# Patient Record
Sex: MALE | Race: WHITE | NOT HISPANIC OR LATINO | Employment: OTHER | URBAN - NONMETROPOLITAN AREA
[De-identification: names, ages, dates, MRNs, and addresses within clinical notes are randomized per-mention and may not be internally consistent; named-entity substitution may affect disease eponyms.]

---

## 2023-11-13 PROBLEM — Z79.01 ANTICOAGULATED: Status: ACTIVE | Noted: 2023-11-13

## 2023-11-13 PROBLEM — Z98.890 STATUS POST LIGATION OF LEFT ATRIAL APPENDAGE: Status: ACTIVE | Noted: 2023-11-13

## 2023-11-13 PROBLEM — I48.19 PERSISTENT ATRIAL FIBRILLATION (MULTI): Status: ACTIVE | Noted: 2023-11-13

## 2023-11-13 PROBLEM — I25.810 ATHEROSCLEROSIS OF CORONARY ARTERY BYPASS GRAFT OF NATIVE HEART WITHOUT ANGINA PECTORIS: Status: ACTIVE | Noted: 2023-11-13

## 2023-11-13 PROBLEM — E78.5 HYPERLIPIDEMIA: Status: ACTIVE | Noted: 2023-11-13

## 2023-11-13 PROBLEM — Z79.899 HIGH RISK MEDICATION USE: Status: ACTIVE | Noted: 2023-11-13

## 2023-11-13 PROBLEM — I10 HYPERTENSION, ESSENTIAL, BENIGN: Status: ACTIVE | Noted: 2023-11-13

## 2023-11-13 PROBLEM — Z95.2 S/P AVR (AORTIC VALVE REPLACEMENT): Status: ACTIVE | Noted: 2023-11-13

## 2023-11-13 RX ORDER — AMIODARONE HYDROCHLORIDE 200 MG/1
100 TABLET ORAL EVERY OTHER DAY
COMMUNITY
Start: 2021-02-12 | End: 2024-02-05 | Stop reason: SDUPTHER

## 2023-11-13 RX ORDER — LATANOPROST 50 UG/ML
SOLUTION/ DROPS OPHTHALMIC
COMMUNITY

## 2023-11-13 RX ORDER — ASPIRIN 81 MG/1
1 TABLET ORAL DAILY
COMMUNITY
End: 2024-04-02 | Stop reason: WASHOUT

## 2023-11-13 RX ORDER — TAMSULOSIN HYDROCHLORIDE 0.4 MG/1
1 CAPSULE ORAL DAILY
COMMUNITY
End: 2024-03-06 | Stop reason: WASHOUT

## 2023-11-13 RX ORDER — DORZOLAMIDE HCL 20 MG/ML
SOLUTION/ DROPS OPHTHALMIC 2 TIMES DAILY
COMMUNITY

## 2023-11-13 RX ORDER — ROSUVASTATIN CALCIUM 10 MG/1
1 TABLET, COATED ORAL DAILY
COMMUNITY

## 2023-11-13 RX ORDER — SULFASALAZINE 500 MG/1
1500 TABLET ORAL 2 TIMES DAILY
COMMUNITY

## 2023-11-13 RX ORDER — TRIAMCINOLONE ACETONIDE 1 MG/G
CREAM TOPICAL
COMMUNITY

## 2023-11-13 RX ORDER — AMOXICILLIN 500 MG/1
2000 CAPSULE ORAL
COMMUNITY

## 2023-11-13 RX ORDER — ACETAMINOPHEN 500 MG
1 TABLET ORAL DAILY
COMMUNITY

## 2023-11-14 ENCOUNTER — TELEPHONE (OUTPATIENT)
Dept: CARDIOLOGY | Facility: CLINIC | Age: 83
End: 2023-11-14
Payer: MEDICARE

## 2023-11-14 NOTE — TELEPHONE ENCOUNTER
Patient states he is taking Amio 100 mg tablet 1/2 tablet daily.  Patient is having a hard time breaking the tablet and often if not an even break of the tablet. Advised this is the lowest strength.   Patient also expresses concern about taking Eliquis wit hx of AVR. States he has been watching commercial on TV regarding this  Patient also states balance has not been good. Advised to contact pcp to address and inquire about PT.   December visit pending. To Dr. Edgard Hayward, DO

## 2023-11-24 NOTE — TELEPHONE ENCOUNTER
Patient phoned above instructions discussed. Verbalized understanding. States if he has further questions he will address at upcoming OV in December.

## 2023-11-24 NOTE — TELEPHONE ENCOUNTER
Attempted to phone patient, left detailed message to return call. Transferred to [facility] for follow up.

## 2023-12-05 ENCOUNTER — TELEPHONE (OUTPATIENT)
Dept: CARDIOLOGY | Facility: CLINIC | Age: 83
End: 2023-12-05
Payer: MEDICARE

## 2023-12-05 DIAGNOSIS — I48.19 PERSISTENT ATRIAL FIBRILLATION (MULTI): ICD-10-CM

## 2023-12-05 DIAGNOSIS — Z95.2 S/P AVR (AORTIC VALVE REPLACEMENT): ICD-10-CM

## 2023-12-05 NOTE — TELEPHONE ENCOUNTER
Patient called and states he would like an order for a handcap  placard. He states that he does get more winded when walking into the stores. He also questioned the commercial for Eliquis and AVR. I advised his is bioprosthetic and he is not on Eliquis for the Valve but is for his Atrial fib. He verbalized understanding.     TO Marily Silvestre NP

## 2023-12-05 NOTE — TELEPHONE ENCOUNTER
Printed handicap placard. And placed in mail. Spoke with patient and advised of th above. He verbalized understanding.

## 2023-12-14 ENCOUNTER — OFFICE VISIT (OUTPATIENT)
Dept: CARDIOLOGY | Facility: CLINIC | Age: 83
End: 2023-12-14
Payer: MEDICARE

## 2023-12-14 VITALS
BODY MASS INDEX: 26.06 KG/M2 | HEART RATE: 51 BPM | HEIGHT: 67 IN | DIASTOLIC BLOOD PRESSURE: 90 MMHG | SYSTOLIC BLOOD PRESSURE: 148 MMHG | WEIGHT: 166 LBS

## 2023-12-14 DIAGNOSIS — E78.2 MIXED HYPERLIPIDEMIA: ICD-10-CM

## 2023-12-14 DIAGNOSIS — Z95.2 S/P AVR (AORTIC VALVE REPLACEMENT): ICD-10-CM

## 2023-12-14 DIAGNOSIS — E66.3 OVERWEIGHT (BMI 25.0-29.9): ICD-10-CM

## 2023-12-14 DIAGNOSIS — Z79.899 HIGH RISK MEDICATION USE: ICD-10-CM

## 2023-12-14 DIAGNOSIS — Z79.01 ANTICOAGULATED: ICD-10-CM

## 2023-12-14 DIAGNOSIS — Z98.890 STATUS POST LIGATION OF LEFT ATRIAL APPENDAGE: ICD-10-CM

## 2023-12-14 DIAGNOSIS — I10 HYPERTENSION, ESSENTIAL, BENIGN: ICD-10-CM

## 2023-12-14 DIAGNOSIS — I25.810 ATHEROSCLEROSIS OF CORONARY ARTERY BYPASS GRAFT OF NATIVE HEART WITHOUT ANGINA PECTORIS: ICD-10-CM

## 2023-12-14 DIAGNOSIS — I48.19 PERSISTENT ATRIAL FIBRILLATION (MULTI): ICD-10-CM

## 2023-12-14 PROCEDURE — 93000 ELECTROCARDIOGRAM COMPLETE: CPT | Performed by: INTERNAL MEDICINE

## 2023-12-14 PROCEDURE — 3080F DIAST BP >= 90 MM HG: CPT | Performed by: INTERNAL MEDICINE

## 2023-12-14 PROCEDURE — 99214 OFFICE O/P EST MOD 30 MIN: CPT | Performed by: INTERNAL MEDICINE

## 2023-12-14 PROCEDURE — 1159F MED LIST DOCD IN RCRD: CPT | Performed by: INTERNAL MEDICINE

## 2023-12-14 PROCEDURE — 3077F SYST BP >= 140 MM HG: CPT | Performed by: INTERNAL MEDICINE

## 2023-12-14 PROCEDURE — 1160F RVW MEDS BY RX/DR IN RCRD: CPT | Performed by: INTERNAL MEDICINE

## 2023-12-14 PROCEDURE — 1036F TOBACCO NON-USER: CPT | Performed by: INTERNAL MEDICINE

## 2023-12-14 RX ORDER — FLUOXETINE HYDROCHLORIDE 20 MG/1
20 CAPSULE ORAL DAILY
COMMUNITY
End: 2024-03-06 | Stop reason: WASHOUT

## 2023-12-14 RX ORDER — LEVOTHYROXINE SODIUM 88 UG/1
88 TABLET ORAL
COMMUNITY

## 2023-12-14 NOTE — PROGRESS NOTES
"Subjective   Geovanny Elise is a 83 y.o. male       Chief Complaint    Follow-up          83 soon-to-be 84-year-old gentleman returns for follow-up, has increasing ambulatory issues, gait instability however no falls.  He has becomes more short of breath with exertion, has less stamina he states ever since his aortic valve replacement.     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     He underwent aortic valve replacement with trifecta Saint Mono bioprosthetic aortic valve in 2021, and left atrial clip appendage occluder device.     He has paroxysmal atrial fibrillation and today's ECG reveals A-fib with a rate of 51 and a QT corrected interval 405 ms.     He remains on amiodarone and Eliquis with no bleeding or thromboembolic events. He has symptoms of fatigue and GI/bowel related complaints.    Recent July Holter monitor revealed sinus rhythm; however he has persistent, paroxysmal atrial fibs/flutter even on amiodarone.  He has relative intolerance to amiodarone with persistent bradycardia now on alternative dosing 100 mg every other day with heart rate of 51.    He is to see Dr. Page January 4, 2024.  At that appointment I would like him to address consideration for either alternative antiarrhythmic therapy versus rate control as I believe he is having side effects from the amiodarone.  Fortunately he has had no angina or heart failure events.    Will otherwise follow-up with nurse practitioner in 6 months, continue same amiodarone and Eliquis dosing (notably he has had atrial clip JAZZ occlusion device) so therefore anticoagulation therapy might not necessarily be needed.           Review of Systems   All other systems reviewed and are negative.         Visit Vitals  /90 (BP Location: Left arm, Patient Position: Sitting)   Pulse 51   Ht 1.702 m (5' 7\")   Wt 75.3 kg (166 lb)   BMI 26.00 kg/m²   Smoking Status Former   BSA 1.89 m²        Objective   Physical " Exam  Constitutional:       Appearance: Normal appearance. He is normal weight.   HENT:      Nose: Nose normal.   Neck:      Vascular: No carotid bruit.   Cardiovascular:      Rate and Rhythm: Bradycardia present. Rhythm irregular.      Pulses: Normal pulses.      Heart sounds: Normal heart sounds.   Pulmonary:      Effort: Pulmonary effort is normal.   Abdominal:      General: Bowel sounds are normal.      Palpations: Abdomen is soft.   Genitourinary:     Rectum: Normal.   Musculoskeletal:         General: Normal range of motion.      Cervical back: Normal range of motion.      Right lower leg: No edema.      Left lower leg: No edema.   Skin:     General: Skin is warm and dry.   Neurological:      General: No focal deficit present.      Mental Status: He is alert.   Psychiatric:         Mood and Affect: Mood normal.         Behavior: Behavior normal.         Thought Content: Thought content normal.         Judgment: Judgment normal.         Current Medications    Current Outpatient Medications:     amiodarone (Pacerone) 200 mg tablet, Take 0.5 tablets (100 mg) by mouth every other day., Disp: , Rfl:     amoxicillin (Amoxil) 500 mg capsule, Take 4 capsules (2,000 mg) by mouth. Prior to dental work, Disp: , Rfl:     apixaban (Eliquis) 5 mg tablet, Take 1 tablet (5 mg) by mouth 2 times a day., Disp: , Rfl:     aspirin 81 mg EC tablet, Take 1 tablet (81 mg) by mouth once daily. Take one tablet by mouth twice a week, Disp: , Rfl:     calcium carbonate-vitamin D3 600 mg-20 mcg (800 unit) tablet, Take 1 tablet by mouth once daily., Disp: , Rfl:     dorzolamide (Trusopt) 2 % ophthalmic solution, Administer into affected eye(s) twice a day., Disp: , Rfl:     FLUoxetine (PROzac) 20 mg capsule, Take 1 capsule (20 mg) by mouth once daily., Disp: , Rfl:     latanoprost (Xalatan) 0.005 % ophthalmic solution, Administer into affected eye(s) once daily., Disp: , Rfl:     levothyroxine (Synthroid, Levoxyl) 88 mcg tablet, Take 1  tablet (88 mcg) by mouth once daily in the morning. Take before meals., Disp: , Rfl:     multivit-min/ferrous fumarate (MULTI VITAMIN ORAL), Take 1 tablet by mouth once daily., Disp: , Rfl:     rosuvastatin (Crestor) 10 mg tablet, Take 1 tablet (10 mg) by mouth once daily., Disp: , Rfl:     sulfaSALAzine (Azulfidine) 500 mg tablet, Take 3 tablets (1,500 mg) by mouth 3 times a day., Disp: , Rfl:     tamsulosin (Flomax) 0.4 mg 24 hr capsule, Take 1 capsule (0.4 mg) by mouth once daily., Disp: , Rfl:     triamcinolone (Kenalog) 0.1 % cream, as directed, Disp: , Rfl:                      Assessment/Plan   1. Atherosclerosis of coronary artery bypass graft of native heart without angina pectoris        2. Persistent atrial fibrillation (CMS/HCC)        3. S/P AVR (aortic valve replacement)        4. Status post ligation of left atrial appendage        5. Hypertension, essential, benign        6. Mixed hyperlipidemia        7. High risk medication use        8. Anticoagulated        9. Overweight (BMI 25.0-29.9)             EKG done in office today

## 2023-12-14 NOTE — PATIENT INSTRUCTIONS
Please bring all medicines, vitamins, and herbal supplements with you when you come to the office.    Prescriptions will not be filled unless you are compliant with your follow up appointments or have a follow up appointment scheduled as per instruction of your physician. Refills should be requested at the time of your visit.     Amiodarone follow up per routine

## 2024-01-09 ENCOUNTER — TELEPHONE (OUTPATIENT)
Dept: CARDIOLOGY | Facility: CLINIC | Age: 84
End: 2024-01-09
Payer: MEDICARE

## 2024-01-09 NOTE — TELEPHONE ENCOUNTER
Dr. Kim office phoned reports patient to have dental extraction, no date provided. Requesting hold eliquis 3 days prior. Inquiring if pre med needed. TO Marily Silvestre NP in Dr. Edgard Hayward, DO absence.     Patient had AVR in the past will require ATB, follow AHA guidelines.     Callback [phone]

## 2024-02-05 DIAGNOSIS — I48.19 PERSISTENT ATRIAL FIBRILLATION (MULTI): ICD-10-CM

## 2024-02-05 RX ORDER — AMIODARONE HYDROCHLORIDE 100 MG/1
100 TABLET ORAL EVERY OTHER DAY
Qty: 45 TABLET | Refills: 1 | Status: SHIPPED | OUTPATIENT
Start: 2024-02-05 | End: 2024-03-06 | Stop reason: ALTCHOICE

## 2024-02-05 NOTE — TELEPHONE ENCOUNTER
Patient requesting the 100mg tablet, states difficult to cut for him. Advised there may be a price difference he stated ok, to please send the 100mg tablets to [facility].

## 2024-03-06 ENCOUNTER — OFFICE VISIT (OUTPATIENT)
Dept: CARDIOLOGY | Facility: CLINIC | Age: 84
End: 2024-03-06
Payer: MEDICARE

## 2024-03-06 VITALS
BODY MASS INDEX: 24.8 KG/M2 | DIASTOLIC BLOOD PRESSURE: 78 MMHG | HEIGHT: 67 IN | HEART RATE: 68 BPM | SYSTOLIC BLOOD PRESSURE: 130 MMHG | WEIGHT: 158 LBS

## 2024-03-06 DIAGNOSIS — Z87.891 FORMER SMOKER: ICD-10-CM

## 2024-03-06 DIAGNOSIS — Z98.890 STATUS POST LIGATION OF LEFT ATRIAL APPENDAGE: ICD-10-CM

## 2024-03-06 DIAGNOSIS — Z79.01 ANTICOAGULATED: ICD-10-CM

## 2024-03-06 DIAGNOSIS — Z79.899 HIGH RISK MEDICATION USE: ICD-10-CM

## 2024-03-06 DIAGNOSIS — I48.19 PERSISTENT ATRIAL FIBRILLATION (MULTI): Primary | ICD-10-CM

## 2024-03-06 PROCEDURE — 3075F SYST BP GE 130 - 139MM HG: CPT | Performed by: INTERNAL MEDICINE

## 2024-03-06 PROCEDURE — 1036F TOBACCO NON-USER: CPT | Performed by: INTERNAL MEDICINE

## 2024-03-06 PROCEDURE — 93000 ELECTROCARDIOGRAM COMPLETE: CPT | Performed by: INTERNAL MEDICINE

## 2024-03-06 PROCEDURE — 3078F DIAST BP <80 MM HG: CPT | Performed by: INTERNAL MEDICINE

## 2024-03-06 PROCEDURE — 1157F ADVNC CARE PLAN IN RCRD: CPT | Performed by: INTERNAL MEDICINE

## 2024-03-06 PROCEDURE — 1159F MED LIST DOCD IN RCRD: CPT | Performed by: INTERNAL MEDICINE

## 2024-03-06 PROCEDURE — 99215 OFFICE O/P EST HI 40 MIN: CPT | Performed by: INTERNAL MEDICINE

## 2024-03-06 NOTE — PATIENT INSTRUCTIONS
STOP Amiodarone  Will obtain 48 hour holter in 2 weeks  Will also obtain Echocardiogram  Follow up after testing  Continue same medications and treatments.   Patient educated on proper medication use.   Patient educated on risk factor modification.   Please bring any lab results from other providers / physicians to your next appointment.     Please bring all medicines, vitamins, and herbal supplements with you when you come to the office.     Prescriptions will not be filled unless you are compliant with your follow up appointments or have a follow up appointment scheduled as per instruction of your physician. Refills should be requested at the time of your visit.   April MURRAY LPN, am scribing for and in the presence of Dr. Nelson Page MD

## 2024-03-06 NOTE — PROGRESS NOTES
CARDIOLOGY OFFICE VISIT      CHIEF COMPLAINT  Chief Complaint   Patient presents with    Follow-up     Patient presented today for a 1 year follow up.  EKG done in office today.         HISTORY OF PRESENT ILLNESS  HPI    84-year-old  male who is followed for valvular heart disease status post tricuspid aortic valve replacement with a #23 Saint Mono trifecta valve on 2021 and left atrial appendage exclusion with a #35 AtriCure clip. He presents to the office today for follow-up evaluation of atrial fibrillation controlled on amiodarone and anticoagulated with Eliquis. He has a history of hypothyroidism on replacement therapy and dyslipidemia on statin.    Patient states that lately he has been noticing getting more tired fatigue doing physical activities as well as dizziness.  Patient was recently evaluated with primary cardiology team.  During this evaluation EKG revealed atrial fibrillation with a rate of 51 bpm.    EKG performed today shows atrial fibrillation rate of 60 bpm QRS ration 70 ms QT corrected 430 ms.  Rhythm strip shows the same pattern.    Patient still using amiodarone 100 mg every other day as well as Eliquis therapy.        Past Medical History  Past Medical History:   Diagnosis Date    COVID-19 2021    COVID    Nonrheumatic aortic (valve) stenosis 2022    Aortic stenosis, moderate    Overweight 2022    Overweight with body mass index (BMI) of 26 to 26.9 in adult    Presence of aortocoronary bypass graft 2022    S/P CABG (coronary artery bypass graft)       Social History  Social History     Tobacco Use    Smoking status: Former     Types: Cigarettes     Quit date:      Years since quittin.1    Smokeless tobacco: Never   Substance Use Topics    Alcohol use: Never    Drug use: Never       Family History     Family History   Problem Relation Name Age of Onset    Diabetes Mother      Coronary artery disease Mother      Coronary artery disease  Father      Colon cancer Brother          Allergies:  No Known Allergies     Outpatient Medications:  Current Outpatient Medications   Medication Instructions    amiodarone (PACERONE) 100 mg, oral, Every other day, Patient requesting smaller tablet form    amoxicillin (AMOXIL) 2,000 mg, oral, Prior to dental work     apixaban (Eliquis) 5 mg tablet 1 tablet, oral, 2 times daily    aspirin 81 mg EC tablet 1 tablet, oral, Daily, Take one tablet by mouth twice a week    calcium carbonate-vitamin D3 600 mg-20 mcg (800 unit) tablet 1 tablet, oral, Daily    dorzolamide (Trusopt) 2 % ophthalmic solution ophthalmic (eye), 2 times daily    latanoprost (Xalatan) 0.005 % ophthalmic solution ophthalmic (eye), Daily RT    levothyroxine (SYNTHROID, LEVOXYL) 88 mcg, oral, Daily before breakfast    multivit-min/ferrous fumarate (MULTI VITAMIN ORAL) 1 tablet, oral, Daily    rosuvastatin (Crestor) 10 mg tablet 1 tablet, oral, Daily    sulfaSALAzine (AZULFIDINE) 1,000 mg, oral, 2 times daily    triamcinolone (Kenalog) 0.1 % cream as directed          REVIEW OF SYSTEMS  Review of Systems   All other systems reviewed and are negative.        VITALS  Vitals:    03/06/24 1104   BP: 130/78   Pulse: 68       PHYSICAL EXAM  Constitutional:       Appearance: Healthy appearance. Not in distress.   Eyes:      Pupils: Pupils are equal, round, and reactive to light.   HENT:      Nose: Nose normal.   Neck:      Thyroid: Thyroid normal.   Pulmonary:      Effort: Pulmonary effort is normal.      Breath sounds: Normal breath sounds.   Cardiovascular:      PMI at left midclavicular line. Normal rate. Irregularly irregular rhythm. Normal S1. Normal S2.       Murmurs: There is no murmur.      No gallop.  No click. No rub.   Pulses:     Intact distal pulses.   Edema:     Peripheral edema absent.   Abdominal:      General: Bowel sounds are normal.   Musculoskeletal: Normal range of motion.      Cervical back: Normal range of motion. Skin:     General: Skin  is warm and dry.   Neurological:      Mental Status: Alert and oriented to person, place and time.           ASSESSMENT AND PLAN  Clinical impressions:  1. Aortic valve stenosis status post aortic valve replacement with a #23 Saint Mono trifecta valve on February 8, 2021 and left atrial appendage exclusion with a #35 AtriCure clip.  2. Paroxysmal atrial fibrillation controlled on amiodarone and anticoagulated with Eliquis.  3. Normal left ventricular function per 2D echocardiogram dated November 9, 2021.  4. Dyslipidemia on statin.  5. Hypertension, controlled  6. Hypothyroidism on replacement therapy.  7. Benign prostatic hypertrophy.  8. Coronary artery disease status post remote coronary artery bypass graft surgery.  9. Overweight with a BMI of 24    Plan recommendations    I had a lengthy discussion with patient regarding management for atrial fibrillation, high risk medication and results of Holter monitor.    Holter monitor ordered in July 2023 shows underlying rhythm was sinus rhythm with minimal heart rate of 45 bpm maximal heart rate of 90 bpm average heart rate of 60 bpm.  The longest pause was 2 seconds at 2:10 AM.  No evidence of atrial fibrillation.    Patient is currently on amiodarone therapy.  Due to persistent atrial fibrillation, and evidence of sinus node dysfunction, amiodarone will be discontinued.  Patient also has been noticing occasionally problems with his balance.    We will reorder the Holter monitor for 48 hours for evaluation of sinus node function during atrial fibrillation.    He will be seen in my office in the next 3 to 4 weeks or sooner if needed.    Get echocardiogram for left ventricular ejection fraction evaluation.    Risk factor modification and lifestyle modification discussed with patient. Diet , exercise and hydration discussed with patient.    I have personally review with patient during this office visit, laboratory data, echocardiogram results, stress test results,  Holter-event monitor results prior and after the last electrophysiology visit. All questions has been answered.    Please excuse any errors in grammar or translation related to this dictation.  Voice recognition software was utilized to prepare this document.

## 2024-03-20 ENCOUNTER — APPOINTMENT (OUTPATIENT)
Dept: CARDIOLOGY | Facility: CLINIC | Age: 84
End: 2024-03-20
Payer: MEDICARE

## 2024-03-21 ENCOUNTER — HOSPITAL ENCOUNTER (OUTPATIENT)
Dept: CARDIOLOGY | Facility: CLINIC | Age: 84
Discharge: HOME | End: 2024-03-21
Payer: MEDICARE

## 2024-03-21 ENCOUNTER — ANCILLARY PROCEDURE (OUTPATIENT)
Dept: CARDIOLOGY | Facility: CLINIC | Age: 84
End: 2024-03-21
Payer: MEDICARE

## 2024-03-21 VITALS
HEIGHT: 67 IN | SYSTOLIC BLOOD PRESSURE: 130 MMHG | BODY MASS INDEX: 24.8 KG/M2 | WEIGHT: 158 LBS | DIASTOLIC BLOOD PRESSURE: 78 MMHG

## 2024-03-21 DIAGNOSIS — I48.19 PERSISTENT ATRIAL FIBRILLATION (MULTI): ICD-10-CM

## 2024-03-21 LAB
AORTIC VALVE MEAN GRADIENT: 6 MMHG
AORTIC VALVE PEAK VELOCITY: 1.76 M/S
AV PEAK GRADIENT: 12.4 MMHG
AVA (PEAK VEL): 2.48 CM2
AVA (VTI): 2.79 CM2
EJECTION FRACTION APICAL 4 CHAMBER: 65.6
LEFT VENTRICLE INTERNAL DIMENSION DIASTOLE: 3.6 CM (ref 3.5–6)
LEFT VENTRICULAR OUTFLOW TRACT DIAMETER: 2.3 CM
RIGHT VENTRICLE PEAK SYSTOLIC PRESSURE: 25.7 MMHG

## 2024-03-21 PROCEDURE — 93306 TTE W/DOPPLER COMPLETE: CPT | Performed by: INTERNAL MEDICINE

## 2024-03-21 PROCEDURE — 93227 XTRNL ECG REC<48 HR R&I: CPT | Performed by: INTERNAL MEDICINE

## 2024-03-21 PROCEDURE — 93306 TTE W/DOPPLER COMPLETE: CPT

## 2024-03-21 PROCEDURE — 93225 XTRNL ECG REC<48 HRS REC: CPT | Performed by: INTERNAL MEDICINE

## 2024-03-27 LAB — BODY SURFACE AREA: 1.84 M2

## 2024-04-02 ENCOUNTER — OFFICE VISIT (OUTPATIENT)
Dept: CARDIOLOGY | Facility: CLINIC | Age: 84
End: 2024-04-02
Payer: MEDICARE

## 2024-04-02 VITALS
DIASTOLIC BLOOD PRESSURE: 82 MMHG | WEIGHT: 164 LBS | SYSTOLIC BLOOD PRESSURE: 136 MMHG | HEART RATE: 60 BPM | BODY MASS INDEX: 25.74 KG/M2 | HEIGHT: 67 IN

## 2024-04-02 DIAGNOSIS — I48.21 PERMANENT ATRIAL FIBRILLATION (MULTI): ICD-10-CM

## 2024-04-02 DIAGNOSIS — I48.19 PERSISTENT ATRIAL FIBRILLATION (MULTI): ICD-10-CM

## 2024-04-02 DIAGNOSIS — E66.3 OVERWEIGHT (BMI 25.0-29.9): ICD-10-CM

## 2024-04-02 DIAGNOSIS — Z87.891 FORMER SMOKER: ICD-10-CM

## 2024-04-02 DIAGNOSIS — Z79.899 HIGH RISK MEDICATION USE: ICD-10-CM

## 2024-04-02 DIAGNOSIS — Z79.01 ANTICOAGULATED: Primary | ICD-10-CM

## 2024-04-02 DIAGNOSIS — I10 HYPERTENSION, ESSENTIAL, BENIGN: ICD-10-CM

## 2024-04-02 DIAGNOSIS — Z98.890 STATUS POST LIGATION OF LEFT ATRIAL APPENDAGE: ICD-10-CM

## 2024-04-02 DIAGNOSIS — I25.810 ATHEROSCLEROSIS OF CORONARY ARTERY BYPASS GRAFT OF NATIVE HEART WITHOUT ANGINA PECTORIS: ICD-10-CM

## 2024-04-02 DIAGNOSIS — E78.2 MIXED HYPERLIPIDEMIA: ICD-10-CM

## 2024-04-02 DIAGNOSIS — Z95.2 S/P AVR (AORTIC VALVE REPLACEMENT): ICD-10-CM

## 2024-04-02 DIAGNOSIS — I49.8 CHRONOTROPIC INCOMPETENCE WITH SINUS NODE DYSFUNCTION: ICD-10-CM

## 2024-04-02 PROCEDURE — 1159F MED LIST DOCD IN RCRD: CPT | Performed by: NURSE PRACTITIONER

## 2024-04-02 PROCEDURE — 1157F ADVNC CARE PLAN IN RCRD: CPT | Performed by: NURSE PRACTITIONER

## 2024-04-02 PROCEDURE — 3079F DIAST BP 80-89 MM HG: CPT | Performed by: NURSE PRACTITIONER

## 2024-04-02 PROCEDURE — 1160F RVW MEDS BY RX/DR IN RCRD: CPT | Performed by: NURSE PRACTITIONER

## 2024-04-02 PROCEDURE — 1036F TOBACCO NON-USER: CPT | Performed by: NURSE PRACTITIONER

## 2024-04-02 PROCEDURE — 99214 OFFICE O/P EST MOD 30 MIN: CPT | Performed by: NURSE PRACTITIONER

## 2024-04-02 PROCEDURE — 3075F SYST BP GE 130 - 139MM HG: CPT | Performed by: NURSE PRACTITIONER

## 2024-04-02 NOTE — PATIENT INSTRUCTIONS
Leadless pacemaker implant with Dr. Camilo LEWIS. Hold Eliquis for 2 days before the procedure.  Take all other medications as prescribed the day of the procedure with a sip of water.  No food to eat or drink after midnight the day of the procedure.

## 2024-04-02 NOTE — H&P (VIEW-ONLY)
"CARDIOLOGY OFFICE VISIT      CHIEF COMPLAINT  Chief Complaint   Patient presents with    Atrial Fibrillation     Chief complaint: \"I absolutely have no energy.\"  HISTORY OF PRESENT ILLNESS  HPI  History: The patient is a 84-year-old  male who is followed for valvular heart disease status post tricuspid aortic valve replacement with a #23 Saint Mono trifecta valve on 2021 and left atrial appendage exclusion with a #35 AtriCure clip.  Additionally he is followed for permanent atrial fibrillation on no rate controlling medications and anticoagulated with Eliquis.  He is also followed for hypothyroidism on replacement therapy and dyslipidemia on statin.  He was seen in the office by Dr. Page on 2024 at which time he complained of increased fatigue.  He was at that time taking amiodarone 100 mg every other day and Eliquis.  The amiodarone was discontinued.  The patient underwent a 48-hour Holter monitor and presents to the office today for those results.  He states he remains fatigued.  He reports that he believed after he had his valve replaced he would have an increase in energy.  He states that he is short of breath with climbing stairs or exerting himself.  He denies orthopnea, abdominal distention, or lower extremity edema.  Additionally he denies chest pain, palpitations, dizziness or lightheadedness.  Past Medical History  Past Medical History:   Diagnosis Date    COVID-19 2021    COVID    Nonrheumatic aortic (valve) stenosis 2022    Aortic stenosis, moderate    Overweight 2022    Overweight with body mass index (BMI) of 26 to 26.9 in adult    Presence of aortocoronary bypass graft 2022    S/P CABG (coronary artery bypass graft)       Social History  Social History     Tobacco Use    Smoking status: Former     Types: Cigarettes     Quit date:      Years since quittin.2    Smokeless tobacco: Never   Substance Use Topics    Alcohol use: Never    Drug " use: Never       Family History     Family History   Problem Relation Name Age of Onset    Diabetes Mother      Coronary artery disease Mother      Coronary artery disease Father      Colon cancer Brother          Allergies:  No Known Allergies     Outpatient Medications:  Current Outpatient Medications   Medication Instructions    amoxicillin (AMOXIL) 2,000 mg, oral, Prior to dental work     apixaban (Eliquis) 5 mg tablet 1 tablet, oral, 2 times daily    calcium carbonate-vitamin D3 600 mg-20 mcg (800 unit) tablet 1 tablet, oral, Daily    dorzolamide (Trusopt) 2 % ophthalmic solution ophthalmic (eye), 2 times daily    latanoprost (Xalatan) 0.005 % ophthalmic solution ophthalmic (eye), Daily RT    levothyroxine (SYNTHROID, LEVOXYL) 88 mcg, oral, Daily before breakfast    multivit-min/ferrous fumarate (MULTI VITAMIN ORAL) 1 tablet, oral, Daily    rosuvastatin (Crestor) 10 mg tablet 1 tablet, oral, Daily    sulfaSALAzine (AZULFIDINE) 1,500 mg, oral, 2 times daily    triamcinolone (Kenalog) 0.1 % cream as directed          REVIEW OF SYSTEMS  Review of Systems   All other systems reviewed and are negative.        VITALS  Vitals:    04/02/24 1220   BP: 136/82   Pulse: 60       PHYSICAL EXAM  Vitals and nursing note reviewed.   Constitutional:       Appearance: Normal appearance.   HENT:      Head: Normocephalic.   Neck:      Vascular: No JVD.   Cardiovascular:      Rate and Rhythm: Normal rate and irregular rhythm.      Pulses: Normal pulses.      Heart sounds: Normal heart sounds.   Pulmonary:      Effort: Pulmonary effort is normal.      Breath sounds: Normal breath sounds.   Abdominal:      General: Bowel sounds are normal.      Palpations: Abdomen is soft.   Musculoskeletal:         General: Normal range of motion.      Cervical back: Normal range of motion.   Skin:     General: Skin is warm and dry.   Neurological:      General: No focal deficit present.      Mental Status: She is alert and oriented to person,  place, and time.      Motor: Motor function is intact.   Psychiatric:         Attention and Perception: Attention and perception normal.         Mood and Affect: Mood and affect normal.         Speech: Speech normal.         Behavior: Behavior normal. Behavior is cooperative.         Thought Content: Thought content normal.         Cognition and Memory: Cognition and memory normal.     Labs and testing: Twelve-lead EKG reveals atrial fibrillation with controlled ventricular response with a ventricular rate of 60 bpm.  QRS durations 96 ms,  ms, QTc 420 ms.  No acute ischemic changes or infarct patterns are noted.  48-hour Holter monitor dated March 21, 2024 reveals a minimum heart rate of 33 bpm, average heart rate 58 bpm, maximum heart rate 105 bpm.  1 4 beat run of nonsustained ventricular tachycardia was noted at 1:29 PM at a rate of 146 bpm.  13 sinus pauses lasting 2 seconds to 3.8 seconds were noted with most pauses between 11 AM and 12 PM.  2D echocardiogram dated March 21, 2024 reveals an ejection fraction of 65 to 70%, mild LVH, mild left atrial enlargement, trace to mild MR, mild TR, bioprosthetic aortic valve with normal function.      ASSESSMENT AND PLAN       Clinical impressions:  1. Aortic valve stenosis status post aortic valve replacement with a #23 Saint Mono trifecta valve on February 8, 2021 and left atrial appendage exclusion with a #35 AtriCure clip.  2.  Permanent atrial fibrillation on no rate controlling medications and anticoagulated with Eliquis.  3. Normal left ventricular function per 2D echocardiogram dated March 21, 2024.  4. Dyslipidemia on statin.  5. Hypertension, controlled with a blood pressure today of 136/82.  6. Hypothyroidism on replacement therapy.  7. Benign prostatic hypertrophy.  8. Coronary artery disease status post remote coronary artery bypass graft surgery.  9. Overweight with a BMI of 26.07.    Recommendations:  1.  I reviewed the findings of the Holter monitor  with the patient and I discussed the frequent sinus pauses may be related to the patient's fatigue.  I discussed implantation of a single-chamber pacemaker versus a leadless pacemaker.  Patient is in agreement with undergoing placement of a leadless pacemaker.  Patient will be scheduled for implantation by Dr. Page as soon as possible.  He will hold the Eliquis for 2 days prior to the procedure and take all other medications as prescribed the day of the procedure with a small sip of water.  2.  Discussed routine device follow up is scheduled every 3-4 months.  Inclinic checks alternate with remote (home) checks.  Inclinic checks will be scheduled prior to the office visit with Electrophysiology.  3.  Reviewed the indication, procedure, and imponderables for device implant.  Restrictions post implant include arm lifting up to shoulder height for one month then full range of motion.  Weight lifting restricted to 10 pounds on the affected side for one month then 35 pounds (push, pull, lift, or carry) lifelong.  MRI may be obtained one month post implant on all MRI compatible devices.  Notify [facility] for signs of infection including fever, chills, or purulent drainage.  I discussed with the patient this device is implanted by catheter inserted through the right groin.  I also discussed implantation of a single-chamber pacemaker with a lead.  All questions were answered.  New  4.  Follow-ups will be pending the clinical course.    Evaluation and note by Dilma Martinez CNP  **Please excuse any errors in grammar or translation related to this dictation.  Voice recognition software was utilized to prepare this document.**

## 2024-04-16 LAB
NON-UH HIE BASOPHILS # (AUTO): 0 10*3/UL (ref 0–0.2)
NON-UH HIE BASOPHILS % (AUTO): 0.4 %
NON-UH HIE EOSINOPHILS # (AUTO): 0 10*3/UL (ref 0–0.45)
NON-UH HIE EOSINOPHILS % (AUTO): 0.5 %
NON-UH HIE ERYTHROCYTE SEDIMENTATION RATE: 23 (ref 0–19)
NON-UH HIE HEMATOCRIT: 40.2 % (ref 38.8–50)
NON-UH HIE HEMOGLOBIN: 13.3 G/DL (ref 13–17)
NON-UH HIE INR: 1.3
NON-UH HIE LYMPHOCYTES # (AUTO): 1 10*3/UL (ref 1–4.8)
NON-UH HIE LYMPHOCYTES % (AUTO): 13.6 %
NON-UH HIE MEAN CORPUSCULAR HEMOGLOBIN: 32.8 PG (ref 27.5–35.2)
NON-UH HIE MEAN CORPUSCULAR HGB CONC: 33.1 G/DL (ref 32.5–35.6)
NON-UH HIE MEAN CORPUSCULAR VOLUME: 99 FL (ref 83.5–101)
NON-UH HIE MEAN PLATELET VOLUME: 7.1 FL (ref 6.6–10.1)
NON-UH HIE MONOCYTES # (AUTO): 0.9 10*3/UL (ref 0–0.8)
NON-UH HIE MONOCYTES % (AUTO): 12 %
NON-UH HIE NEUTROPHILS # (AUTO): 5.3 10*3/UL (ref 1.8–7.7)
NON-UH HIE NEUTROPHILS % (AUTO): 73.5 %
NON-UH HIE NRBC%: 0.1 /100{WBC} (ref 0–0.5)
NON-UH HIE PLATELET COUNT: 232 10*3/UL (ref 150–450)
NON-UH HIE PROTHROMBIN TIME: 14.5 S (ref 9–12.9)
NON-UH HIE RED BLOOD COUNT: 4.06 (ref 3.9–5.6)
NON-UH HIE RED CELL DISTRIBUTION WIDTH: 14.3 % (ref 12–14.8)
NON-UH HIE UNCORRECTED WBC: 7.2 10*3/UL (ref 4.1–10.5)
NON-UH HIE WHITE BLOOD COUNT: 7.2 10*3/UL (ref 4.1–10.5)

## 2024-04-22 ENCOUNTER — TELEPHONE (OUTPATIENT)
Dept: CARDIOLOGY | Facility: HOSPITAL | Age: 84
End: 2024-04-22

## 2024-04-23 ENCOUNTER — ANESTHESIA (OUTPATIENT)
Dept: CARDIOLOGY | Facility: HOSPITAL | Age: 84
End: 2024-04-23
Payer: MEDICARE

## 2024-04-23 ENCOUNTER — ANESTHESIA EVENT (OUTPATIENT)
Dept: CARDIOLOGY | Facility: HOSPITAL | Age: 84
End: 2024-04-23
Payer: MEDICARE

## 2024-04-23 ENCOUNTER — HOSPITAL ENCOUNTER (OUTPATIENT)
Facility: HOSPITAL | Age: 84
Discharge: HOME | End: 2024-04-24
Attending: INTERNAL MEDICINE | Admitting: INTERNAL MEDICINE
Payer: MEDICARE

## 2024-04-23 ENCOUNTER — APPOINTMENT (OUTPATIENT)
Dept: CARDIOLOGY | Facility: HOSPITAL | Age: 84
End: 2024-04-23
Payer: MEDICARE

## 2024-04-23 DIAGNOSIS — I48.21 PERMANENT ATRIAL FIBRILLATION (MULTI): ICD-10-CM

## 2024-04-23 DIAGNOSIS — I49.8 CHRONOTROPIC INCOMPETENCE WITH SINUS NODE DYSFUNCTION: Primary | ICD-10-CM

## 2024-04-23 DIAGNOSIS — Z95.0 PRESENCE OF LEADLESS CARDIAC PACEMAKER: ICD-10-CM

## 2024-04-23 PROBLEM — E03.9 HYPOTHYROIDISM: Status: ACTIVE | Noted: 2024-04-23

## 2024-04-23 PROBLEM — I49.5 SINUS NODE DYSFUNCTION (MULTI): Status: ACTIVE | Noted: 2024-04-23

## 2024-04-23 LAB
ANION GAP SERPL CALC-SCNC: 11 MMOL/L (ref 10–20)
APTT PPP: 35 SECONDS (ref 27–38)
BUN SERPL-MCNC: 12 MG/DL (ref 6–23)
CALCIUM SERPL-MCNC: 9.5 MG/DL (ref 8.6–10.3)
CHLORIDE SERPL-SCNC: 106 MMOL/L (ref 98–107)
CO2 SERPL-SCNC: 28 MMOL/L (ref 21–32)
CREAT SERPL-MCNC: 0.86 MG/DL (ref 0.5–1.3)
EGFRCR SERPLBLD CKD-EPI 2021: 86 ML/MIN/1.73M*2
ERYTHROCYTE [DISTWIDTH] IN BLOOD BY AUTOMATED COUNT: 14 % (ref 11.5–14.5)
GLUCOSE SERPL-MCNC: 93 MG/DL (ref 74–99)
HCT VFR BLD AUTO: 41.2 % (ref 41–52)
HGB BLD-MCNC: 13.8 G/DL (ref 13.5–17.5)
INR PPP: 1.1 (ref 0.9–1.1)
MCH RBC QN AUTO: 32.9 PG (ref 26–34)
MCHC RBC AUTO-ENTMCNC: 33.5 G/DL (ref 32–36)
MCV RBC AUTO: 98 FL (ref 80–100)
NRBC BLD-RTO: 0 /100 WBCS (ref 0–0)
PLATELET # BLD AUTO: 207 X10*3/UL (ref 150–450)
POTASSIUM SERPL-SCNC: 4.1 MMOL/L (ref 3.5–5.3)
PROTHROMBIN TIME: 12.1 SECONDS (ref 9.8–12.8)
RBC # BLD AUTO: 4.2 X10*6/UL (ref 4.5–5.9)
SODIUM SERPL-SCNC: 141 MMOL/L (ref 136–145)
WBC # BLD AUTO: 6.3 X10*3/UL (ref 4.4–11.3)

## 2024-04-23 PROCEDURE — 33274 TCAT INSJ/RPL PERM LDLS PM: CPT

## 2024-04-23 PROCEDURE — G0378 HOSPITAL OBSERVATION PER HR: HCPCS

## 2024-04-23 PROCEDURE — 2500000001 HC RX 250 WO HCPCS SELF ADMINISTERED DRUGS (ALT 637 FOR MEDICARE OP): Performed by: NURSE PRACTITIONER

## 2024-04-23 PROCEDURE — 33274 TCAT INSJ/RPL PERM LDLS PM: CPT | Performed by: INTERNAL MEDICINE

## 2024-04-23 PROCEDURE — 2750000001 HC OR 275 NO HCPCS: Performed by: INTERNAL MEDICINE

## 2024-04-23 PROCEDURE — 85027 COMPLETE CBC AUTOMATED: CPT | Performed by: NURSE PRACTITIONER

## 2024-04-23 PROCEDURE — 2500000004 HC RX 250 GENERAL PHARMACY W/ HCPCS (ALT 636 FOR OP/ED): Performed by: NURSE PRACTITIONER

## 2024-04-23 PROCEDURE — C1893 INTRO/SHEATH, FIXED,NON-PEEL: HCPCS | Performed by: INTERNAL MEDICINE

## 2024-04-23 PROCEDURE — 7100000009 HC PHASE TWO TIME - INITIAL BASE CHARGE: Performed by: INTERNAL MEDICINE

## 2024-04-23 PROCEDURE — 2500000006 HC RX 250 W HCPCS SELF ADMINISTERED DRUGS (ALT 637 FOR ALL PAYERS): Performed by: NURSE PRACTITIONER

## 2024-04-23 PROCEDURE — 2500000004 HC RX 250 GENERAL PHARMACY W/ HCPCS (ALT 636 FOR OP/ED): Performed by: NURSE ANESTHETIST, CERTIFIED REGISTERED

## 2024-04-23 PROCEDURE — 80048 BASIC METABOLIC PNL TOTAL CA: CPT | Performed by: NURSE PRACTITIONER

## 2024-04-23 PROCEDURE — 36415 COLL VENOUS BLD VENIPUNCTURE: CPT | Performed by: NURSE PRACTITIONER

## 2024-04-23 PROCEDURE — 93010 ELECTROCARDIOGRAM REPORT: CPT | Performed by: INTERNAL MEDICINE

## 2024-04-23 PROCEDURE — 85610 PROTHROMBIN TIME: CPT | Performed by: NURSE PRACTITIONER

## 2024-04-23 PROCEDURE — 2500000005 HC RX 250 GENERAL PHARMACY W/O HCPCS: Performed by: INTERNAL MEDICINE

## 2024-04-23 PROCEDURE — 93005 ELECTROCARDIOGRAM TRACING: CPT | Mod: 59

## 2024-04-23 PROCEDURE — C1894 INTRO/SHEATH, NON-LASER: HCPCS | Performed by: INTERNAL MEDICINE

## 2024-04-23 PROCEDURE — 2720000007 HC OR 272 NO HCPCS: Performed by: INTERNAL MEDICINE

## 2024-04-23 PROCEDURE — 3700000002 HC GENERAL ANESTHESIA TIME - EACH INCREMENTAL 1 MINUTE: Performed by: INTERNAL MEDICINE

## 2024-04-23 PROCEDURE — C1769 GUIDE WIRE: HCPCS | Performed by: INTERNAL MEDICINE

## 2024-04-23 PROCEDURE — C1786 PMKR, SINGLE, RATE-RESP: HCPCS | Performed by: INTERNAL MEDICINE

## 2024-04-23 PROCEDURE — 7100000011 HC EXTENDED STAY RECOVERY HOURLY - NURSING UNIT

## 2024-04-23 PROCEDURE — 7100000010 HC PHASE TWO TIME - EACH INCREMENTAL 1 MINUTE: Performed by: INTERNAL MEDICINE

## 2024-04-23 PROCEDURE — 3700000001 HC GENERAL ANESTHESIA TIME - INITIAL BASE CHARGE: Performed by: INTERNAL MEDICINE

## 2024-04-23 DEVICE — IMPLANTABLE DEVICE: Type: IMPLANTABLE DEVICE | Site: GROIN | Status: FUNCTIONAL

## 2024-04-23 RX ORDER — FENTANYL CITRATE 50 UG/ML
INJECTION, SOLUTION INTRAMUSCULAR; INTRAVENOUS AS NEEDED
Status: DISCONTINUED | OUTPATIENT
Start: 2024-04-23 | End: 2024-04-23

## 2024-04-23 RX ORDER — PROPOFOL 10 MG/ML
INJECTION, EMULSION INTRAVENOUS AS NEEDED
Status: DISCONTINUED | OUTPATIENT
Start: 2024-04-23 | End: 2024-04-23

## 2024-04-23 RX ORDER — PROPOFOL 10 MG/ML
INJECTION, EMULSION INTRAVENOUS CONTINUOUS PRN
Status: DISCONTINUED | OUTPATIENT
Start: 2024-04-23 | End: 2024-04-23

## 2024-04-23 RX ORDER — SODIUM CHLORIDE 9 MG/ML
20 INJECTION, SOLUTION INTRAVENOUS CONTINUOUS
Status: DISCONTINUED | OUTPATIENT
Start: 2024-04-23 | End: 2024-04-23

## 2024-04-23 RX ORDER — PROTAMINE SULFATE 10 MG/ML
INJECTION, SOLUTION INTRAVENOUS AS NEEDED
Status: DISCONTINUED | OUTPATIENT
Start: 2024-04-23 | End: 2024-04-23

## 2024-04-23 RX ORDER — CHLORHEXIDINE GLUCONATE 40 MG/ML
SOLUTION TOPICAL ONCE
Status: COMPLETED | OUTPATIENT
Start: 2024-04-23 | End: 2024-04-23

## 2024-04-23 RX ORDER — LEVOTHYROXINE SODIUM 88 UG/1
88 TABLET ORAL
Status: DISCONTINUED | OUTPATIENT
Start: 2024-04-24 | End: 2024-04-24 | Stop reason: HOSPADM

## 2024-04-23 RX ORDER — MUPIROCIN 20 MG/G
1 OINTMENT TOPICAL ONCE
Status: COMPLETED | OUTPATIENT
Start: 2024-04-23 | End: 2024-04-23

## 2024-04-23 RX ORDER — TRAMADOL HYDROCHLORIDE 50 MG/1
50 TABLET ORAL EVERY 6 HOURS PRN
Status: DISCONTINUED | OUTPATIENT
Start: 2024-04-23 | End: 2024-04-24 | Stop reason: HOSPADM

## 2024-04-23 RX ORDER — ACETAMINOPHEN 325 MG/1
650 TABLET ORAL EVERY 4 HOURS PRN
Status: DISCONTINUED | OUTPATIENT
Start: 2024-04-23 | End: 2024-04-24 | Stop reason: HOSPADM

## 2024-04-23 RX ORDER — ROSUVASTATIN CALCIUM 10 MG/1
10 TABLET, COATED ORAL DAILY
Status: DISCONTINUED | OUTPATIENT
Start: 2024-04-23 | End: 2024-04-24 | Stop reason: HOSPADM

## 2024-04-23 RX ORDER — LIDOCAINE HYDROCHLORIDE 10 MG/ML
INJECTION, SOLUTION EPIDURAL; INFILTRATION; INTRACAUDAL; PERINEURAL AS NEEDED
Status: DISCONTINUED | OUTPATIENT
Start: 2024-04-23 | End: 2024-04-23 | Stop reason: HOSPADM

## 2024-04-23 RX ORDER — SULFASALAZINE 500 MG/1
1500 TABLET ORAL 2 TIMES DAILY
Status: DISCONTINUED | OUTPATIENT
Start: 2024-04-23 | End: 2024-04-24 | Stop reason: HOSPADM

## 2024-04-23 RX ORDER — CEFAZOLIN SODIUM 1 G/50ML
1 SOLUTION INTRAVENOUS ONCE
Status: COMPLETED | OUTPATIENT
Start: 2024-04-23 | End: 2024-04-23

## 2024-04-23 RX ORDER — ONDANSETRON HYDROCHLORIDE 2 MG/ML
4 INJECTION, SOLUTION INTRAVENOUS EVERY 8 HOURS PRN
Status: DISCONTINUED | OUTPATIENT
Start: 2024-04-23 | End: 2024-04-24 | Stop reason: HOSPADM

## 2024-04-23 RX ORDER — HEPARIN SODIUM 1000 [USP'U]/ML
INJECTION, SOLUTION INTRAVENOUS; SUBCUTANEOUS AS NEEDED
Status: DISCONTINUED | OUTPATIENT
Start: 2024-04-23 | End: 2024-04-23

## 2024-04-23 RX ADMIN — SODIUM CHLORIDE 20 ML/HR: 9 INJECTION, SOLUTION INTRAVENOUS at 07:38

## 2024-04-23 RX ADMIN — MUPIROCIN 1 APPLICATION: 20 OINTMENT TOPICAL at 07:37

## 2024-04-23 RX ADMIN — FENTANYL CITRATE 25 MCG: 50 INJECTION, SOLUTION INTRAMUSCULAR; INTRAVENOUS at 09:45

## 2024-04-23 RX ADMIN — ROSUVASTATIN CALCIUM 10 MG: 10 TABLET, FILM COATED ORAL at 19:55

## 2024-04-23 RX ADMIN — PROPOFOL 30 MCG/KG/MIN: 10 INJECTION, EMULSION INTRAVENOUS at 09:22

## 2024-04-23 RX ADMIN — PROPOFOL 30 MG: 10 INJECTION, EMULSION INTRAVENOUS at 09:37

## 2024-04-23 RX ADMIN — HEPARIN SODIUM 2000 UNITS: 1000 INJECTION INTRAVENOUS; SUBCUTANEOUS at 09:46

## 2024-04-23 RX ADMIN — Medication: at 07:38

## 2024-04-23 RX ADMIN — SODIUM CHLORIDE 20 ML/HR: 9 INJECTION, SOLUTION INTRAVENOUS at 07:37

## 2024-04-23 RX ADMIN — PROPOFOL 30 MG: 10 INJECTION, EMULSION INTRAVENOUS at 09:18

## 2024-04-23 RX ADMIN — SULFASALAZINE 1500 MG: 500 TABLET ORAL at 19:55

## 2024-04-23 RX ADMIN — PROPOFOL 30 MG: 10 INJECTION, EMULSION INTRAVENOUS at 09:44

## 2024-04-23 RX ADMIN — PROPOFOL 30 MG: 10 INJECTION, EMULSION INTRAVENOUS at 10:01

## 2024-04-23 RX ADMIN — PROTAMINE SULFATE 20 MG: 10 INJECTION, SOLUTION INTRAVENOUS at 10:03

## 2024-04-23 SDOH — SOCIAL STABILITY: SOCIAL INSECURITY: DO YOU FEEL UNSAFE GOING BACK TO THE PLACE WHERE YOU ARE LIVING?: NO

## 2024-04-23 SDOH — SOCIAL STABILITY: SOCIAL INSECURITY: ABUSE: ADULT

## 2024-04-23 SDOH — SOCIAL STABILITY: SOCIAL INSECURITY: HAS ANYONE EVER THREATENED TO HURT YOUR FAMILY OR YOUR PETS?: NO

## 2024-04-23 SDOH — SOCIAL STABILITY: SOCIAL INSECURITY: ARE THERE ANY APPARENT SIGNS OF INJURIES/BEHAVIORS THAT COULD BE RELATED TO ABUSE/NEGLECT?: NO

## 2024-04-23 SDOH — SOCIAL STABILITY: SOCIAL INSECURITY: DOES ANYONE TRY TO KEEP YOU FROM HAVING/CONTACTING OTHER FRIENDS OR DOING THINGS OUTSIDE YOUR HOME?: NO

## 2024-04-23 SDOH — SOCIAL STABILITY: SOCIAL INSECURITY: ARE YOU OR HAVE YOU BEEN THREATENED OR ABUSED PHYSICALLY, EMOTIONALLY, OR SEXUALLY BY ANYONE?: NO

## 2024-04-23 SDOH — HEALTH STABILITY: MENTAL HEALTH: CURRENT SMOKER: 0

## 2024-04-23 SDOH — SOCIAL STABILITY: SOCIAL INSECURITY: WERE YOU ABLE TO COMPLETE ALL THE BEHAVIORAL HEALTH SCREENINGS?: YES

## 2024-04-23 SDOH — SOCIAL STABILITY: SOCIAL INSECURITY: DO YOU FEEL ANYONE HAS EXPLOITED OR TAKEN ADVANTAGE OF YOU FINANCIALLY OR OF YOUR PERSONAL PROPERTY?: NO

## 2024-04-23 SDOH — SOCIAL STABILITY: SOCIAL INSECURITY: HAVE YOU HAD THOUGHTS OF HARMING ANYONE ELSE?: NO

## 2024-04-23 SDOH — SOCIAL STABILITY: SOCIAL INSECURITY: HAVE YOU HAD ANY THOUGHTS OF HARMING ANYONE ELSE?: NO

## 2024-04-23 ASSESSMENT — COGNITIVE AND FUNCTIONAL STATUS - GENERAL
MOBILITY SCORE: 24
PATIENT BASELINE BEDBOUND: NO
DAILY ACTIVITIY SCORE: 24

## 2024-04-23 ASSESSMENT — COLUMBIA-SUICIDE SEVERITY RATING SCALE - C-SSRS
1. IN THE PAST MONTH, HAVE YOU WISHED YOU WERE DEAD OR WISHED YOU COULD GO TO SLEEP AND NOT WAKE UP?: NO
6. HAVE YOU EVER DONE ANYTHING, STARTED TO DO ANYTHING, OR PREPARED TO DO ANYTHING TO END YOUR LIFE?: NO
2. HAVE YOU ACTUALLY HAD ANY THOUGHTS OF KILLING YOURSELF?: NO

## 2024-04-23 ASSESSMENT — LIFESTYLE VARIABLES
AUDIT-C TOTAL SCORE: 0
SKIP TO QUESTIONS 9-10: 1
HOW OFTEN DO YOU HAVE A DRINK CONTAINING ALCOHOL: NEVER
HOW MANY STANDARD DRINKS CONTAINING ALCOHOL DO YOU HAVE ON A TYPICAL DAY: PATIENT DOES NOT DRINK
AUDIT-C TOTAL SCORE: 0
HOW OFTEN DO YOU HAVE 6 OR MORE DRINKS ON ONE OCCASION: NEVER

## 2024-04-23 ASSESSMENT — PAIN SCALES - GENERAL
PAINLEVEL_OUTOF10: 0 - NO PAIN
PAINLEVEL_OUTOF10: 0 - NO PAIN
PAIN_LEVEL: 0

## 2024-04-23 ASSESSMENT — ACTIVITIES OF DAILY LIVING (ADL)
JUDGMENT_ADEQUATE_SAFELY_COMPLETE_DAILY_ACTIVITIES: YES
BATHING: INDEPENDENT
DRESSING YOURSELF: INDEPENDENT
LACK_OF_TRANSPORTATION: NO
HEARING - RIGHT EAR: HEARING AID
WALKS IN HOME: INDEPENDENT
ADEQUATE_TO_COMPLETE_ADL: YES
FEEDING YOURSELF: INDEPENDENT
PATIENT'S MEMORY ADEQUATE TO SAFELY COMPLETE DAILY ACTIVITIES?: YES
HEARING - LEFT EAR: HEARING AID
TOILETING: INDEPENDENT
GROOMING: INDEPENDENT

## 2024-04-23 ASSESSMENT — PAIN - FUNCTIONAL ASSESSMENT
PAIN_FUNCTIONAL_ASSESSMENT: 0-10
PAIN_FUNCTIONAL_ASSESSMENT: 0-10

## 2024-04-23 ASSESSMENT — PATIENT HEALTH QUESTIONNAIRE - PHQ9
1. LITTLE INTEREST OR PLEASURE IN DOING THINGS: NOT AT ALL
2. FEELING DOWN, DEPRESSED OR HOPELESS: NOT AT ALL
SUM OF ALL RESPONSES TO PHQ9 QUESTIONS 1 & 2: 0

## 2024-04-23 NOTE — Clinical Note
Fluid total post procedure: 400 mL Pt wife has been notified.  She says she thinks he also has pink eye.  She will take him to UC to be evaluated

## 2024-04-23 NOTE — ANESTHESIA PREPROCEDURE EVALUATION
Geovanny Elise is a 84 y.o. male here for:    PPM Leadless Implant  With Nelson Page MD  Pre-Op Diagnosis Codes:     * Incompetent heart function [I49.8]     * Atrial fibrillation (irregular heartbeat) [I48.21]    Relevant Problems   Cardiac  Echo 3/2024:  CONCLUSIONS:   1. Left ventricular systolic function is normal with a 65-70% estimated ejection fraction.   2. Mild LVH.   3. Spectral Doppler shows a restrictive pattern of left ventricular diastolic filling.   4. Mildly dilated left atrium.   5. Trace to mild mitral valve regurgitation.   6. Mild tricuspid regurgitation is visualized.   7. RVSP within normal limits.   8. The aortic valve is bioprosthetic. Peak gradient measured around 12 mmHg. Mean gradient around 6 mmHg. Calculated aortic valve area is 2.48 cmï¿½ consistent with good bioprosthetic aortic valve function.   9. Mildly dilated aortic root at 4 cm.  10. No change when compared to previous study.     (+) Atherosclerosis of coronary artery bypass graft of native heart without angina pectoris   (+) Hyperlipidemia   (+) Hypertension, essential, benign   (+) Permanent atrial fibrillation (Multi)   (+) Persistent atrial fibrillation (Multi)      Endocrine   (+) Hypothyroidism       Lab Results   Component Value Date    HGB 14.6 2021    HCT 44.8 2021    WBC 5.3 2021     2021     2022    K 4.0 2022     2022    CREATININE 1.04 2022    BUN 13 2022       Social History     Tobacco Use   Smoking Status Former    Current packs/day: 0.00    Types: Cigarettes    Quit date:     Years since quittin.3   Smokeless Tobacco Never       No Known Allergies    Current Outpatient Medications   Medication Instructions    amoxicillin (AMOXIL) 2,000 mg, oral, Prior to dental work     apixaban (Eliquis) 5 mg tablet 1 tablet, oral, 2 times daily    calcium carbonate-vitamin D3 600 mg-20 mcg (800 unit) tablet 1 tablet, oral, Daily    dorzolamide  (Trusopt) 2 % ophthalmic solution ophthalmic (eye), 2 times daily    latanoprost (Xalatan) 0.005 % ophthalmic solution ophthalmic (eye), Daily RT    levothyroxine (SYNTHROID, LEVOXYL) 88 mcg, oral, Daily before breakfast    multivit-min/ferrous fumarate (MULTI VITAMIN ORAL) 1 tablet, oral, Daily    rosuvastatin (Crestor) 10 mg tablet 1 tablet, oral, Daily    sulfaSALAzine (AZULFIDINE) 1,500 mg, oral, 2 times daily    triamcinolone (Kenalog) 0.1 % cream as directed       Past Surgical History:   Procedure Laterality Date    AORTIC VALVE REPLACEMENT      OTHER SURGICAL HISTORY  09/17/2021    Aortic valve replacement    OTHER SURGICAL HISTORY  09/17/2021    Cardioversion    OTHER SURGICAL HISTORY  09/17/2021    Colon surgery    OTHER SURGICAL HISTORY  09/17/2021    Shoulder surgery    OTHER SURGICAL HISTORY  09/17/2021    Complete colonoscopy    OTHER SURGICAL HISTORY  06/30/2022    Circumcision    OTHER SURGICAL HISTORY  06/30/2022    Cystoscopy    OTHER SURGICAL HISTORY  12/29/2021    Tooth extraction       Family History   Problem Relation Name Age of Onset    Diabetes Mother      Coronary artery disease Mother      Coronary artery disease Father      Colon cancer Brother         NPO Details:  NPO/Void Status  Date of Last Liquid: 04/23/24  Time of Last Liquid: 0600  Date of Last Solid: 04/22/24  Time of Last Solid: 1800  Last Intake Type: Clear fluids  Time of Last Void: 0600        Physical Exam    Airway  Mallampati: III  TM distance: >3 FB  Neck ROM: full     Cardiovascular - normal exam     Dental - normal exam     Pulmonary - normal exam     Abdominal            Anesthesia Plan    History of general anesthesia?: yes  History of complications of general anesthesia?: no    ASA 3     MAC     The patient is not a current smoker.    intravenous induction   Anesthetic plan and risks discussed with patient.    Plan discussed with CRNA.

## 2024-04-23 NOTE — Clinical Note
Sheath was exchanged in the right femoral vein with INTRODUCER, HEMOSTASIS, FAST-CATH, 8 FR X 23 CM, W/VALVE, 0.038 GUIDEWIRE.

## 2024-04-23 NOTE — Clinical Note
Patient ID band present and verified. Patient contact is in waiting room. Contact(s) present: brother.

## 2024-04-23 NOTE — ANESTHESIA POSTPROCEDURE EVALUATION
Patient: Geovanny Elise    Procedure Summary       Date: 04/23/24 Room / Location: [facility] / [facility]    Anesthesia Start: 0916 Anesthesia Stop:     Procedure: PPM Leadless Implant (Right: Groin) Diagnosis:       Chronotropic incompetence with sinus node dysfunction      Permanent atrial fibrillation (Multi)      (Chronotropic incompetence with sinus node dysfunction [I49.8])      (Permanent atrial fibrillation (CMS/HCC) [I48.21])    Providers: Nelson Page MD Responsible Provider: NAYANA Cabrera    Anesthesia Type: MAC ASA Status: 3            Anesthesia Type: MAC    Vitals Value Taken Time   /64 04/23/24 1020   Temp 36.0 04/23/24 1020   Pulse 60 04/23/24 1020   Resp 15 04/23/24 1020   SpO2 97 04/23/24 1020       Anesthesia Post Evaluation    Patient location during evaluation: bedside  Patient participation: complete - patient participated  Level of consciousness: sleepy but conscious  Pain score: 0  Pain management: adequate  Airway patency: patent  Cardiovascular status: hemodynamically stable  Respiratory status: nonlabored ventilation and nasal cannula  Hydration status: acceptable  Postoperative Nausea and Vomiting: none        There were no known notable events for this encounter.

## 2024-04-24 ENCOUNTER — APPOINTMENT (OUTPATIENT)
Dept: CARDIOLOGY | Facility: HOSPITAL | Age: 84
End: 2024-04-24
Payer: MEDICARE

## 2024-04-24 ENCOUNTER — APPOINTMENT (OUTPATIENT)
Dept: RADIOLOGY | Facility: HOSPITAL | Age: 84
End: 2024-04-24
Payer: MEDICARE

## 2024-04-24 VITALS
RESPIRATION RATE: 16 BRPM | HEIGHT: 67 IN | DIASTOLIC BLOOD PRESSURE: 59 MMHG | TEMPERATURE: 98.2 F | SYSTOLIC BLOOD PRESSURE: 107 MMHG | OXYGEN SATURATION: 94 % | HEART RATE: 63 BPM | WEIGHT: 164.9 LBS | BODY MASS INDEX: 25.88 KG/M2

## 2024-04-24 PROCEDURE — 93279 PRGRMG DEV EVAL PM/LDLS PM: CPT | Performed by: INTERNAL MEDICINE

## 2024-04-24 PROCEDURE — 93279 PRGRMG DEV EVAL PM/LDLS PM: CPT

## 2024-04-24 PROCEDURE — 71046 X-RAY EXAM CHEST 2 VIEWS: CPT | Performed by: STUDENT IN AN ORGANIZED HEALTH CARE EDUCATION/TRAINING PROGRAM

## 2024-04-24 PROCEDURE — 93290 INTERROG DEV EVAL ICPMS IP: CPT | Performed by: INTERNAL MEDICINE

## 2024-04-24 PROCEDURE — 2500000006 HC RX 250 W HCPCS SELF ADMINISTERED DRUGS (ALT 637 FOR ALL PAYERS): Performed by: NURSE PRACTITIONER

## 2024-04-24 PROCEDURE — 99239 HOSP IP/OBS DSCHRG MGMT >30: CPT | Performed by: NURSE PRACTITIONER

## 2024-04-24 PROCEDURE — 71046 X-RAY EXAM CHEST 2 VIEWS: CPT

## 2024-04-24 PROCEDURE — 7100000011 HC EXTENDED STAY RECOVERY HOURLY - NURSING UNIT

## 2024-04-24 RX ADMIN — LEVOTHYROXINE SODIUM 88 MCG: 0.09 TABLET ORAL at 05:15

## 2024-04-24 RX ADMIN — SULFASALAZINE 1500 MG: 500 TABLET ORAL at 07:56

## 2024-04-24 ASSESSMENT — ACTIVITIES OF DAILY LIVING (ADL): LACK_OF_TRANSPORTATION: NO

## 2024-04-24 NOTE — DISCHARGE INSTRUCTIONS
Discharge instructions after a leadless pacemaker placement    After a procedure using sedation  You should return home and rest for the remainder of the day and evening.   It is recommended a responsible adult be with you for the first 24 hours after the procedure.  Do not make any legal decisions for 24 hours after your procedure.  Do not drink alcoholic beverages for 24 hours after your procedure.    Call 911 or seek medical attention for:  Severe chest pain  Change in mental status or weakness in extremities.  Dizziness, light headedness, or feeling faint.  If there is a large amount of bleeding or spurting of blood.  DO NOT drive yourself to the hospital.    Activity  Avoid heavy lifting (over 10 pounds), strenuous activity/exercise, squatting or excessive bending for 7 days.  Avoid sexual activity for 3 days and until any groin discomfort has ceased.  No driving for 48 hours after procedure.    Groin site care  The transparent dressing should be removed from the site 24 hours after the procedure.    It is ok to shower after transparent dressing is removed  Wash the site gently with soap and water.   Rinse well and pat dry.  You may apply a Band-Aid to the site.   Avoid lotions, ointments, or powders until fully healed.  No submerged bathing, swimming, or hot tubs for the next 7 days, or until fully healed.  You may take acetaminophen (Tylenol) as directed for discomfort.      Notify your provider  If you develop a large area of bruising or a large lump.  It is normal to notice a small bruise around the puncture site and/or a small lump.   If bleeding should occur, lay down and apply pressure to the affected area for 15 minutes.    If groin pain is not relieved with acetaminophen (Tylenol).  If you develop tingling, numbness, pain, or coolness in the leg/arm beyond the site where the procedure was performed.    Follow up appointments  You will be scheduled for an incision check in 1 week and follow up  appointment with your provider in 3-4 months.  Any necessary appointments will be scheduled and appear on your After Visit Summary.

## 2024-04-24 NOTE — PROGRESS NOTES
04/24/24 1248   Discharge Planning   Living Arrangements Alone   Support Systems Children;Family members   Assistance Needed none   Type of Residence Private residence   Home or Post Acute Services None   Patient expects to be discharged to: HOME   Does the patient need discharge transport arranged? No   Transportation Needs   In the past 12 months, has lack of transportation kept you from medical appointments or from getting medications? no   In the past 12 months, has lack of transportation kept you from meetings, work, or from getting things needed for daily living? No   Patient Choice   Patient / Family choosing to utilize agency / facility established prior to hospitalization No     Pt hereas extended stay  with implantation of leadless pacemaker.  Pt resides home alone,  has sons and daughter in law who reside close and assist as needed.  Pt is independent,  owns walker and wheelchair however does not need to use at present time.  Pt' s brother Ovi is to provide transportation home when medically ready.  Anticipate dc later today.  No needs identified, will continue to follow if needs arise.

## 2024-04-24 NOTE — DISCHARGE SUMMARY
Discharge Diagnosis  Chronotropic incompetence with sinus node dysfunction    Issues Requiring Follow-Up  Leadless pacemaker placement    Test Results Pending At Discharge  Pending Labs       No current pending labs.            Hospital Course   84-year-old male admitted [facility] with sinus node dysfunction and permanent atrial fibrillation for leadless pacemaker placement.  Patient underwent procedure per Dr Page, see full operative report.  At this time patient is up out of bed ambulating in room without difficulty.  He denies palpitations, dizziness, lightheadedness, shortness of breath, or chest discomfort.  Right groin site dressing removed.  No hematoma, bleeding, or ecchymosis noted at site.  Figure 8 stitch removed and dressing reapplied.  Device interrogation shows normal device function.  Postprocedure potential complications, activity restrictions, medications, and follow-up were reviewed with patient.  Patient will hold anticoagulation x 2 days and resume on 4/27/2024.  Patient to follow-up in the [place] office with ALEENA Calixto for right groin incision check and in 3 months with device clinic and follow-up appointment with Dr. Page.    Pertinent Physical Exam At Time of Discharge  Physical Exam  Constitutional:       Appearance: Normal appearance.   HENT:      Head: Normocephalic.   Eyes:      Conjunctiva/sclera: Conjunctivae normal.   Cardiovascular:      Rate and Rhythm: Normal rate. Rhythm irregular.      Pulses: Normal pulses.      Heart sounds: Normal heart sounds.      Comments: Right groin site dressing removed.  Figure 8 stitch removed.  No bleeding, hematoma, or ecchymosis noted at site.  Dressing reapplied.  Pulmonary:      Effort: Pulmonary effort is normal.   Musculoskeletal:         General: Normal range of motion.   Skin:     General: Skin is warm and dry.   Neurological:      Mental Status: He is alert and oriented to person, place, and time.         Home  Medications     Medication List      CHANGE how you take these medications     apixaban 5 mg tablet; Commonly known as: Eliquis; Take 1 tablet (5 mg)   by mouth 2 times a day. Hold for 2 days and resume on 4/26/24; What   changed: additional instructions     CONTINUE taking these medications     amoxicillin 500 mg capsule; Commonly known as: Amoxil   calcium carbonate-vitamin D3 600 mg-20 mcg (800 unit) tablet   dorzolamide 2 % ophthalmic solution; Commonly known as: Trusopt   latanoprost 0.005 % ophthalmic solution; Commonly known as: Xalatan   levothyroxine 88 mcg tablet; Commonly known as: Synthroid, Levoxyl   MULTI VITAMIN ORAL   rosuvastatin 10 mg tablet; Commonly known as: Crestor   sulfaSALAzine 500 mg tablet; Commonly known as: Azulfidine   triamcinolone 0.1 % cream; Commonly known as: Kenalog       Outpatient Follow-Up  Future Appointments   Date Time Provider Department Center   5/1/2024 11:30 AM CRISTIANO Morales [id number] [place]   6/17/2024 10:00 AM CRISTIANO Morales [id number] [place]   7/31/2024 10:40 AM [id number] [place]    7/31/2024 11:15 AM Nelson Page MD [id number] [place]   12/17/2024 10:00 AM Edgard Hayward DO [id number] [place]   Total discharge time 40 minutes    CRISTIANO Low

## 2024-04-25 LAB
ATRIAL RATE: 100 BPM
ATRIAL RATE: 375 BPM
Q ONSET: 189 MS
Q ONSET: 228 MS
QRS COUNT: 11 BEATS
QRS COUNT: 9 BEATS
QRS DURATION: 168 MS
QRS DURATION: 88 MS
QT INTERVAL: 436 MS
QT INTERVAL: 504 MS
QTC CALCULATION(BAZETT): 417 MS
QTC CALCULATION(BAZETT): 528 MS
QTC FREDERICIA: 423 MS
QTC FREDERICIA: 520 MS
R AXIS: -41 DEGREES
R AXIS: -80 DEGREES
T AXIS: 33 DEGREES
T AXIS: 78 DEGREES
T OFFSET: 441 MS
T OFFSET: 446 MS
VENTRICULAR RATE: 55 BPM
VENTRICULAR RATE: 66 BPM

## 2024-04-30 ENCOUNTER — APPOINTMENT (OUTPATIENT)
Dept: CARDIOLOGY | Facility: CLINIC | Age: 84
End: 2024-04-30
Payer: MEDICARE

## 2024-05-01 ENCOUNTER — OFFICE VISIT (OUTPATIENT)
Dept: CARDIOLOGY | Facility: CLINIC | Age: 84
End: 2024-05-01
Payer: MEDICARE

## 2024-05-01 VITALS
SYSTOLIC BLOOD PRESSURE: 130 MMHG | HEART RATE: 62 BPM | BODY MASS INDEX: 25.43 KG/M2 | WEIGHT: 162 LBS | DIASTOLIC BLOOD PRESSURE: 84 MMHG | HEIGHT: 67 IN

## 2024-05-01 DIAGNOSIS — Z95.0 PRESENCE OF LEADLESS CARDIAC PACEMAKER: ICD-10-CM

## 2024-05-01 DIAGNOSIS — E66.3 OVERWEIGHT (BMI 25.0-29.9): Primary | ICD-10-CM

## 2024-05-01 PROCEDURE — 3079F DIAST BP 80-89 MM HG: CPT | Performed by: NURSE PRACTITIONER

## 2024-05-01 PROCEDURE — 1159F MED LIST DOCD IN RCRD: CPT | Performed by: NURSE PRACTITIONER

## 2024-05-01 PROCEDURE — 99024 POSTOP FOLLOW-UP VISIT: CPT | Performed by: NURSE PRACTITIONER

## 2024-05-01 PROCEDURE — 1036F TOBACCO NON-USER: CPT | Performed by: NURSE PRACTITIONER

## 2024-05-01 PROCEDURE — 1157F ADVNC CARE PLAN IN RCRD: CPT | Performed by: NURSE PRACTITIONER

## 2024-05-01 PROCEDURE — 1160F RVW MEDS BY RX/DR IN RCRD: CPT | Performed by: NURSE PRACTITIONER

## 2024-05-01 PROCEDURE — 3075F SYST BP GE 130 - 139MM HG: CPT | Performed by: NURSE PRACTITIONER

## 2024-05-01 RX ORDER — DEXTROMETHORPHAN HYDROBROMIDE, GUAIFENESIN 5; 100 MG/5ML; MG/5ML
650 LIQUID ORAL EVERY 8 HOURS PRN
COMMUNITY

## 2024-05-01 NOTE — PROGRESS NOTES
Patient presents to office today for wound check.    April 23, 204 uneventful implant Medtronic MICRA VR2 leadless pacemaker.    He denies severe fever or chills.  Denies any pain at right groin access site.  Tegaderm was removed, there is resolving ecchymosis.  No pain or tenderness, no paresthesia in the right foot.  Slight well-approximated without evidence of hematoma.    He has resumed Eliquis without reported concerns.    He has scheduled follow-up in the device clinic and EP service.  Otherwise, we will keep general cardiology follow-up.    Assessment:     Presence of leadless cardiac pacemaker  April 23, 2024 Medtronic Micra VR2 leadless PPM    Overall patient is pleased with current state of cardiovascular health.  At this time there are no indications for additional cardiovascular testing or need for medication changes.     Marily Frank  MSN, APRN-CNP, PMHNP-BC  [facility]    Please excuse any errors in grammar or translation related to this dictation. Voice recognition software was utilized to prepare this document.

## 2024-05-01 NOTE — PATIENT INSTRUCTIONS
Please bring all medicines, vitamins, and herbal supplements with you when you come to the office.    Prescriptions will not be filled unless you are compliant with your follow up appointments or have a follow up appointment scheduled as per instruction of your physician. Refills should be requested at the time of your visit.     Fall Prevention Education Given     PLAN:   Through informed decision making process incorporating patients unique circumstances, the following treatment plan will be initiated:    1.  Prescription drug management of cardiovascular medication for efficacy, adherence to treatment, side effect assessment and polypharmacy. Current treatment clinically warranted and to continue without modifications.    2. Return for follow-up; in the interim, contact the office if new symptoms arise.  Keep scheduled follow up

## 2024-05-01 NOTE — LETTER
May 1, 2024     AKHIL Low-CNP  [address]  [place] [address]    Patient: Geovanny Elise   YOB: 1940   Date of Visit: 5/1/2024       Dear AKHIL Torres-CNP:    Thank you for referring Geovanny Elise to me for evaluation. Below are my notes for this consultation.  If you have questions, please do not hesitate to call me. I look forward to following your patient along with you.       Sincerely,     AKHIL Morales-ALEENA      CC: Jaycob Hobbs, DO  ______________________________________________________________________________________    Patient presents to office today for wound check.    April 23, 204 uneventful implant Medtronic MICRA VR2 leadless pacemaker.    He denies severe fever or chills.  Denies any pain at right groin access site.  Tegaderm was removed, there is resolving ecchymosis.  No pain or tenderness, no paresthesia in the right foot.  Slight well-approximated without evidence of hematoma.    He has resumed Eliquis without reported concerns.    He has scheduled follow-up in the device clinic and EP service.  Otherwise, we will keep general cardiology follow-up.    Assessment:     Presence of leadless cardiac pacemaker  April 23, 2024 Medtronic Micra VR2 leadless PPM    Overall patient is pleased with current state of cardiovascular health.  At this time there are no indications for additional cardiovascular testing or need for medication changes.     Marily Frank  MSN, APRN-CNP, PMHNP-BC  [facility]    Please excuse any errors in grammar or translation related to this dictation. Voice recognition software was utilized to prepare this document.

## 2024-06-04 DIAGNOSIS — Z95.0 PRESENCE OF LEADLESS CARDIAC PACEMAKER: ICD-10-CM

## 2024-06-11 ENCOUNTER — TELEPHONE (OUTPATIENT)
Dept: CARDIOLOGY | Facility: CLINIC | Age: 84
End: 2024-06-11
Payer: MEDICARE

## 2024-06-11 DIAGNOSIS — Z95.0 PRESENCE OF LEADLESS CARDIAC PACEMAKER: ICD-10-CM

## 2024-06-11 NOTE — TELEPHONE ENCOUNTER
Rec'd request from insurance to do prior auth on Eliquis 5mg. Spoke to patient who states he is not having issues getting medication.  Request sent to insurance to start prior auth

## 2024-06-17 ENCOUNTER — APPOINTMENT (OUTPATIENT)
Dept: CARDIOLOGY | Facility: CLINIC | Age: 84
End: 2024-06-17
Payer: MEDICARE

## 2024-06-17 VITALS
DIASTOLIC BLOOD PRESSURE: 84 MMHG | HEIGHT: 67 IN | SYSTOLIC BLOOD PRESSURE: 136 MMHG | WEIGHT: 166.8 LBS | HEART RATE: 66 BPM | BODY MASS INDEX: 26.18 KG/M2

## 2024-06-17 DIAGNOSIS — I25.810 ATHEROSCLEROSIS OF CORONARY ARTERY BYPASS GRAFT OF NATIVE HEART WITHOUT ANGINA PECTORIS: ICD-10-CM

## 2024-06-17 DIAGNOSIS — Z95.2 S/P AVR (AORTIC VALVE REPLACEMENT): ICD-10-CM

## 2024-06-17 DIAGNOSIS — I10 HYPERTENSION, ESSENTIAL, BENIGN: ICD-10-CM

## 2024-06-17 DIAGNOSIS — Z95.0 PRESENCE OF LEADLESS CARDIAC PACEMAKER: ICD-10-CM

## 2024-06-17 DIAGNOSIS — I48.19 PERSISTENT ATRIAL FIBRILLATION (MULTI): ICD-10-CM

## 2024-06-17 DIAGNOSIS — Z79.899 HIGH RISK MEDICATION USE: ICD-10-CM

## 2024-06-17 DIAGNOSIS — E78.2 MIXED HYPERLIPIDEMIA: ICD-10-CM

## 2024-06-17 DIAGNOSIS — Z79.01 ANTICOAGULATED: Primary | ICD-10-CM

## 2024-06-17 PROCEDURE — 3075F SYST BP GE 130 - 139MM HG: CPT | Performed by: NURSE PRACTITIONER

## 2024-06-17 PROCEDURE — 3079F DIAST BP 80-89 MM HG: CPT | Performed by: NURSE PRACTITIONER

## 2024-06-17 PROCEDURE — 99213 OFFICE O/P EST LOW 20 MIN: CPT | Performed by: NURSE PRACTITIONER

## 2024-06-17 PROCEDURE — 1160F RVW MEDS BY RX/DR IN RCRD: CPT | Performed by: NURSE PRACTITIONER

## 2024-06-17 PROCEDURE — 1159F MED LIST DOCD IN RCRD: CPT | Performed by: NURSE PRACTITIONER

## 2024-06-17 PROCEDURE — 1157F ADVNC CARE PLAN IN RCRD: CPT | Performed by: NURSE PRACTITIONER

## 2024-06-17 RX ORDER — FLUOXETINE 10 MG/1
1 CAPSULE ORAL
COMMUNITY
Start: 2023-08-22

## 2024-06-17 NOTE — PROGRESS NOTES
"Chief Complaint  \"So far so good\"    Reason for Visit  6-month follow-up  Patient presents to the office today for outpatient follow-up for coronary artery disease, AVR, persistent A-fib, anticoagulation.  Last evaluated in clinic by Dr. Hayward December 2023.    He has had subsequent follow-up by EP service and dose of amiodarone was discontinued due to persistent atrial fibrillation.  April 23, 2024 uneventful implant Medtronic leadless pacemaker.    Presents today ambulatory with steady gait.    History of Present Illness   Patient is extremely pleasant 84-year-old who presents to the office today reporting\" maybe a little more energy since pacemaker\".  From an activity standpoint he reports a\" long walk\" to his mailbox and he does this on a daily basis.  He ambulated in from the parking lot without any concerns.  Since his valve replacements he has had somewhat persistent fatigability.  He denies any type of exertional chest pain.  There is no palpitations.  He has EP follow-up scheduled and device interrogation scheduled.    Patient reports that overall has no complaint(s) of chest pain, chest pressure/discomfort, dyspnea, exertional chest pressure/discomfort, fatigue, and lower extremity edema    Overall patient is pleased with current state of cardiovascular health.  At this time there are no indications for additional cardiovascular testing or need for medication changes.     Review of Systems   Constitutional: Positive for malaise/fatigue.   Cardiovascular:  Negative for chest pain, dyspnea on exertion, irregular heartbeat, leg swelling, near-syncope, orthopnea, palpitations, paroxysmal nocturnal dyspnea and syncope.        Visit Vitals  /84 (BP Location: Left arm, Patient Position: Sitting)   Pulse 66   Ht 1.702 m (5' 7\")   Wt 75.7 kg (166 lb 12.8 oz)   BMI 26.12 kg/m²   Smoking Status Former   BSA 1.89 m²     Physical Exam  Vitals and nursing note reviewed.   Constitutional:       Appearance: Normal " appearance.   Cardiovascular:      Rate and Rhythm: Normal rate and regular rhythm.      Heart sounds: Murmur heard.      Systolic murmur is present with a grade of 1/6.   Pulmonary:      Effort: Pulmonary effort is normal.      Breath sounds: Normal breath sounds.   Musculoskeletal:      Cervical back: Full passive range of motion without pain.      Right lower leg: No edema.      Left lower leg: No edema.   Skin:     General: Skin is cool.   Neurological:      Mental Status: He is alert and oriented to person, place, and time.   Psychiatric:         Attention and Perception: Attention normal.         Mood and Affect: Mood normal.         Behavior: Behavior is cooperative.      No Known Allergies    Current Outpatient Medications   Medication Instructions    acetaminophen (TYLENOL 8 HOUR) 650 mg, oral, Every 8 hours PRN, Do not crush, chew, or split.    amoxicillin (AMOXIL) 2,000 mg, oral, Prior to dental work     apixaban (ELIQUIS) 5 mg, oral, 2 times daily, Hold for 2 days and resume on 4/26/24    calcium carbonate-vitamin D3 600 mg-20 mcg (800 unit) tablet 1 tablet, oral, Daily    dorzolamide (Trusopt) 2 % ophthalmic solution ophthalmic (eye), 2 times daily    FLUoxetine (PROzac) 10 mg capsule 1 capsule, oral, Daily (0630)    latanoprost (Xalatan) 0.005 % ophthalmic solution ophthalmic (eye), Daily RT    levothyroxine (SYNTHROID, LEVOXYL) 88 mcg, oral, Daily before breakfast    multivit-min/ferrous fumarate (MULTI VITAMIN ORAL) 1 tablet, oral, Daily    rosuvastatin (Crestor) 10 mg tablet 1 tablet, oral, Daily    sulfaSALAzine (AZULFIDINE) 1,500 mg, oral, 2 times daily    triamcinolone (Kenalog) 0.1 % cream as directed      Assessment:    High risk medication use  Amiodarone discontinued Jan 2024 by EP due to persistent afib     Atherosclerosis of coronary artery bypass graft of native heart without angina pectoris  Preop cath showed ostial diagonal 80%, proximal circumflex 99%.  There is no documentation of CABG  completed at time of AVR  Current daily activity 4 METS without recurrent symptoms  Repeat echocardiogram showed no evidence of wall motion abnormality    Hyperlipidemia  Remains on high intensity statin  Reports annual lab work with PCP    Hypertension, essential, benign  Optimal in office    Persistent atrial fibrillation (Multi)  Amiodarone failure  Now persistent atrial fibrillation with treatment strategy rate control on no AV osman blocking agents    S/P AVR (aortic valve replacement)  February 15, 2021 AVR #23 [place] trifecta  September 2021 echo normal functioning bioprosthetic valve with a peak of 20, mean 10    Presence of leadless cardiac pacemaker  April 23, 2024 Medtronic Micra VR2 leadless PPM     Anticoagulated  CHADS VASc 5 anticoagulated full dose Eliquis age 84, weight 77 kg, creatinine 0.86  Denies bleeding diatheses    BMI 26.0-26.9,adult  Reviewed the merits of healthy lifestyle choices on overall cardiovascular health.    Plan:     Through informed decision making process incorporating patients unique circumstances, the following treatment plan will be initiated:    1.  Prescription drug management of cardiovascular medication for efficacy, adherence to treatment, side effect assessment and polypharmacy. Current treatment clinically warranted and to continue without modifications.    2. Return for follow-up; in the interim, contact the office if new symptoms arise.  Dr. Hayward 6 months    Marily Frank  MSN, APRN-CNP, PMHNP-BC  [facility]    Please excuse any errors in grammar or translation related to this dictation. Voice recognition software was utilized to prepare this document.

## 2024-06-17 NOTE — LETTER
"June 18, 2024     Jaycob Hobbs DO  [address]  [facility]  [place] [address]    Patient: Geovanny Elise   YOB: 1940   Date of Visit: 6/17/2024       Dear Dr. Jaycob Hobbs DO:    Thank you for referring Geovanny Elise to me for evaluation. Below are my notes for this consultation.  If you have questions, please do not hesitate to call me. I look forward to following your patient along with you.       Sincerely,     Marily Frank, APRN-CNP      CC: No Recipients  ______________________________________________________________________________________    Chief Complaint  \"So far so good\"    Reason for Visit  6-month follow-up  Patient presents to the office today for outpatient follow-up for coronary artery disease, AVR, persistent A-fib, anticoagulation.  Last evaluated in clinic by Dr. Hayward December 2023.    He has had subsequent follow-up by EP service and dose of amiodarone was discontinued due to persistent atrial fibrillation.  April 23, 2024 uneventful implant Medtronic leadless pacemaker.    Presents today ambulatory with steady gait.    History of Present Illness   Patient is extremely pleasant 84-year-old who presents to the office today reporting\" maybe a little more energy since pacemaker\".  From an activity standpoint he reports a\" long walk\" to his mailbox and he does this on a daily basis.  He ambulated in from the parking lot without any concerns.  Since his valve replacements he has had somewhat persistent fatigability.  He denies any type of exertional chest pain.  There is no palpitations.  He has EP follow-up scheduled and device interrogation scheduled.    Patient reports that overall has no complaint(s) of chest pain, chest pressure/discomfort, dyspnea, exertional chest pressure/discomfort, fatigue, and lower extremity edema    Overall patient is pleased with current state of cardiovascular health.  At this time there are no indications for additional " "cardiovascular testing or need for medication changes.     Review of Systems   Constitutional: Positive for malaise/fatigue.   Cardiovascular:  Negative for chest pain, dyspnea on exertion, irregular heartbeat, leg swelling, near-syncope, orthopnea, palpitations, paroxysmal nocturnal dyspnea and syncope.        Visit Vitals  /84 (BP Location: Left arm, Patient Position: Sitting)   Pulse 66   Ht 1.702 m (5' 7\")   Wt 75.7 kg (166 lb 12.8 oz)   BMI 26.12 kg/m²   Smoking Status Former   BSA 1.89 m²     Physical Exam  Vitals and nursing note reviewed.   Constitutional:       Appearance: Normal appearance.   Cardiovascular:      Rate and Rhythm: Normal rate and regular rhythm.      Heart sounds: Murmur heard.      Systolic murmur is present with a grade of 1/6.   Pulmonary:      Effort: Pulmonary effort is normal.      Breath sounds: Normal breath sounds.   Musculoskeletal:      Cervical back: Full passive range of motion without pain.      Right lower leg: No edema.      Left lower leg: No edema.   Skin:     General: Skin is cool.   Neurological:      Mental Status: He is alert and oriented to person, place, and time.   Psychiatric:         Attention and Perception: Attention normal.         Mood and Affect: Mood normal.         Behavior: Behavior is cooperative.      No Known Allergies    Current Outpatient Medications   Medication Instructions   • acetaminophen (TYLENOL 8 HOUR) 650 mg, oral, Every 8 hours PRN, Do not crush, chew, or split.   • amoxicillin (AMOXIL) 2,000 mg, oral, Prior to dental work    • apixaban (ELIQUIS) 5 mg, oral, 2 times daily, Hold for 2 days and resume on 4/26/24   • calcium carbonate-vitamin D3 600 mg-20 mcg (800 unit) tablet 1 tablet, oral, Daily   • dorzolamide (Trusopt) 2 % ophthalmic solution ophthalmic (eye), 2 times daily   • FLUoxetine (PROzac) 10 mg capsule 1 capsule, oral, Daily (0630)   • latanoprost (Xalatan) 0.005 % ophthalmic solution ophthalmic (eye), Daily RT   • " levothyroxine (SYNTHROID, LEVOXYL) 88 mcg, oral, Daily before breakfast   • multivit-min/ferrous fumarate (MULTI VITAMIN ORAL) 1 tablet, oral, Daily   • rosuvastatin (Crestor) 10 mg tablet 1 tablet, oral, Daily   • sulfaSALAzine (AZULFIDINE) 1,500 mg, oral, 2 times daily   • triamcinolone (Kenalog) 0.1 % cream as directed      Assessment:    High risk medication use  Amiodarone discontinued Jan 2024 by EP due to persistent afib     Atherosclerosis of coronary artery bypass graft of native heart without angina pectoris  Preop cath showed ostial diagonal 80%, proximal circumflex 99%.  There is no documentation of CABG completed at time of AVR  Current daily activity 4 METS without recurrent symptoms  Repeat echocardiogram showed no evidence of wall motion abnormality    Hyperlipidemia  Remains on high intensity statin  Reports annual lab work with PCP    Hypertension, essential, benign  Optimal in office    Persistent atrial fibrillation (Multi)  Amiodarone failure  Now persistent atrial fibrillation with treatment strategy rate control on no AV osman blocking agents    S/P AVR (aortic valve replacement)  February 15, 2021 AVR #23 Saint Mono trifecta  September 2021 echo normal functioning bioprosthetic valve with a peak of 20, mean 10    Presence of leadless cardiac pacemaker  April 23, 2024 Medtronic Micra VR2 leadless PPM     Anticoagulated  CHADS VASc 5 anticoagulated full dose Eliquis age 84, weight 77 kg, creatinine 0.86  Denies bleeding diatheses    BMI 26.0-26.9,adult  Reviewed the merits of healthy lifestyle choices on overall cardiovascular health.    Plan:     Through informed decision making process incorporating patients unique circumstances, the following treatment plan will be initiated:    1.  Prescription drug management of cardiovascular medication for efficacy, adherence to treatment, side effect assessment and polypharmacy. Current treatment clinically warranted and to continue without  modifications.    2. Return for follow-up; in the interim, contact the office if new symptoms arise.  Dr. Hayward 6 months    Marily Frank  MSN, APRN-CNP, PMHNP-BC  [facility]    Please excuse any errors in grammar or translation related to this dictation. Voice recognition software was utilized to prepare this document.

## 2024-06-17 NOTE — PATIENT INSTRUCTIONS
Please bring all medicines, vitamins, and herbal supplements with you when you come to the office.    Prescriptions will not be filled unless you are compliant with your follow up appointments or have a follow up appointment scheduled as per instruction of your physician. Refills should be requested at the time of your visit.     PLAN:   Through informed decision making process incorporating patients unique circumstances, the following treatment plan will be initiated:    1.  Prescription drug management of cardiovascular medication for efficacy, adherence to treatment, side effect assessment and polypharmacy. Current treatment clinically warranted and to continue without modifications.    2. Return for follow-up; in the interim, contact the office if new symptoms arise.  Dr. Hayward 6 months

## 2024-06-18 ASSESSMENT — ENCOUNTER SYMPTOMS
IRREGULAR HEARTBEAT: 0
ORTHOPNEA: 0
DYSPNEA ON EXERTION: 0
SYNCOPE: 0
NEAR-SYNCOPE: 0
PALPITATIONS: 0
PND: 0

## 2024-06-18 NOTE — ASSESSMENT & PLAN NOTE
Amiodarone failure  Now persistent atrial fibrillation with treatment strategy rate control on no AV osman blocking agents

## 2024-06-18 NOTE — ASSESSMENT & PLAN NOTE
CHADS VASc 5 anticoagulated full dose Eliquis age 84, weight 77 kg, creatinine 0.86  Denies bleeding diatheses

## 2024-06-18 NOTE — ASSESSMENT & PLAN NOTE
Preop cath showed ostial diagonal 80%, proximal circumflex 99%.  There is no documentation of CABG completed at time of AVR  Current daily activity 4 METS without recurrent symptoms  Repeat echocardiogram showed no evidence of wall motion abnormality

## 2024-06-18 NOTE — ASSESSMENT & PLAN NOTE
February 15, 2021 AVR #23 Saint Mono trifecta  September 2021 echo normal functioning bioprosthetic valve with a peak of 20, mean 10

## 2024-07-01 ENCOUNTER — HOSPITAL ENCOUNTER (OUTPATIENT)
Dept: CARDIOLOGY | Facility: HOSPITAL | Age: 84
Discharge: HOME | End: 2024-07-01
Payer: MEDICARE

## 2024-07-01 DIAGNOSIS — Z95.0 PACEMAKER: Primary | ICD-10-CM

## 2024-07-01 DIAGNOSIS — I49.5 SICK SINUS SYNDROME (MULTI): ICD-10-CM

## 2024-07-01 DIAGNOSIS — Z95.0 PACEMAKER: ICD-10-CM

## 2024-07-31 ENCOUNTER — APPOINTMENT (OUTPATIENT)
Dept: CARDIOLOGY | Facility: CLINIC | Age: 84
End: 2024-07-31
Payer: MEDICARE

## 2024-07-31 ENCOUNTER — HOSPITAL ENCOUNTER (OUTPATIENT)
Dept: CARDIOLOGY | Facility: HOSPITAL | Age: 84
Discharge: HOME | End: 2024-07-31
Payer: MEDICARE

## 2024-07-31 VITALS
HEART RATE: 65 BPM | WEIGHT: 165 LBS | BODY MASS INDEX: 25.9 KG/M2 | SYSTOLIC BLOOD PRESSURE: 132 MMHG | DIASTOLIC BLOOD PRESSURE: 84 MMHG | HEIGHT: 67 IN

## 2024-07-31 DIAGNOSIS — Z95.0 PACEMAKER: Primary | ICD-10-CM

## 2024-07-31 DIAGNOSIS — I49.5 SICK SINUS SYNDROME (MULTI): ICD-10-CM

## 2024-07-31 DIAGNOSIS — Z95.2 S/P AVR (AORTIC VALVE REPLACEMENT): ICD-10-CM

## 2024-07-31 DIAGNOSIS — Z87.891 FORMER SMOKER: ICD-10-CM

## 2024-07-31 DIAGNOSIS — Z79.899 HIGH RISK MEDICATION USE: ICD-10-CM

## 2024-07-31 DIAGNOSIS — I49.8 CHRONOTROPIC INCOMPETENCE WITH SINUS NODE DYSFUNCTION: ICD-10-CM

## 2024-07-31 DIAGNOSIS — Z95.0 PRESENCE OF LEADLESS CARDIAC PACEMAKER: ICD-10-CM

## 2024-07-31 DIAGNOSIS — I49.5 SINUS NODE DYSFUNCTION (MULTI): ICD-10-CM

## 2024-07-31 DIAGNOSIS — I48.19 PERSISTENT ATRIAL FIBRILLATION (MULTI): ICD-10-CM

## 2024-07-31 DIAGNOSIS — Z79.01 ANTICOAGULATED: ICD-10-CM

## 2024-07-31 PROCEDURE — 1036F TOBACCO NON-USER: CPT | Performed by: INTERNAL MEDICINE

## 2024-07-31 PROCEDURE — 1157F ADVNC CARE PLAN IN RCRD: CPT | Performed by: INTERNAL MEDICINE

## 2024-07-31 PROCEDURE — 93279 PRGRMG DEV EVAL PM/LDLS PM: CPT | Performed by: INTERNAL MEDICINE

## 2024-07-31 PROCEDURE — 93000 ELECTROCARDIOGRAM COMPLETE: CPT | Mod: DISTINCT PROCEDURAL SERVICE | Performed by: INTERNAL MEDICINE

## 2024-07-31 PROCEDURE — 3075F SYST BP GE 130 - 139MM HG: CPT | Performed by: INTERNAL MEDICINE

## 2024-07-31 PROCEDURE — 93279 PRGRMG DEV EVAL PM/LDLS PM: CPT

## 2024-07-31 PROCEDURE — 99214 OFFICE O/P EST MOD 30 MIN: CPT | Performed by: INTERNAL MEDICINE

## 2024-07-31 PROCEDURE — 1159F MED LIST DOCD IN RCRD: CPT | Performed by: INTERNAL MEDICINE

## 2024-07-31 PROCEDURE — 3079F DIAST BP 80-89 MM HG: CPT | Performed by: INTERNAL MEDICINE

## 2024-07-31 ASSESSMENT — ENCOUNTER SYMPTOMS
DYSPNEA ON EXERTION: 0
PALPITATIONS: 0

## 2024-09-13 DIAGNOSIS — Z95.0 PRESENCE OF LEADLESS CARDIAC PACEMAKER: ICD-10-CM

## 2024-09-13 RX ORDER — APIXABAN 5 MG/1
5 TABLET, FILM COATED ORAL 2 TIMES DAILY
Qty: 180 TABLET | Refills: 3 | Status: SHIPPED | OUTPATIENT
Start: 2024-09-13

## 2024-11-29 ENCOUNTER — HOSPITAL ENCOUNTER (OUTPATIENT)
Dept: CARDIOLOGY | Facility: HOSPITAL | Age: 84
Discharge: HOME | End: 2024-11-29
Payer: MEDICARE

## 2024-11-29 DIAGNOSIS — I49.5 SICK SINUS SYNDROME (MULTI): ICD-10-CM

## 2024-11-29 DIAGNOSIS — Z95.0 PACEMAKER: ICD-10-CM

## 2024-11-29 PROCEDURE — 93296 REM INTERROG EVL PM/IDS: CPT

## 2024-12-17 ENCOUNTER — APPOINTMENT (OUTPATIENT)
Dept: CARDIOLOGY | Facility: CLINIC | Age: 84
End: 2024-12-17
Payer: MEDICARE

## 2024-12-17 VITALS
HEART RATE: 62 BPM | DIASTOLIC BLOOD PRESSURE: 88 MMHG | HEIGHT: 67 IN | SYSTOLIC BLOOD PRESSURE: 132 MMHG | WEIGHT: 163.6 LBS | BODY MASS INDEX: 25.68 KG/M2

## 2024-12-17 DIAGNOSIS — Z87.891 FORMER SMOKER: ICD-10-CM

## 2024-12-17 DIAGNOSIS — E78.2 MIXED HYPERLIPIDEMIA: ICD-10-CM

## 2024-12-17 DIAGNOSIS — I48.21 PERMANENT ATRIAL FIBRILLATION (MULTI): ICD-10-CM

## 2024-12-17 DIAGNOSIS — Z95.0 PACEMAKER: ICD-10-CM

## 2024-12-17 DIAGNOSIS — I25.810 ATHEROSCLEROSIS OF CORONARY ARTERY BYPASS GRAFT OF NATIVE HEART WITHOUT ANGINA PECTORIS: ICD-10-CM

## 2024-12-17 DIAGNOSIS — Z79.01 ANTICOAGULATED: ICD-10-CM

## 2024-12-17 DIAGNOSIS — I49.5 SINUS NODE DYSFUNCTION (MULTI): ICD-10-CM

## 2024-12-17 PROBLEM — I48.19 PERSISTENT ATRIAL FIBRILLATION (MULTI): Status: RESOLVED | Noted: 2023-11-13 | Resolved: 2024-12-17

## 2024-12-17 PROBLEM — E78.5 HYPERLIPIDEMIA: Status: RESOLVED | Noted: 2023-11-13 | Resolved: 2024-12-17

## 2024-12-17 PROBLEM — Z79.899 HIGH RISK MEDICATION USE: Status: RESOLVED | Noted: 2023-11-13 | Resolved: 2024-12-17

## 2024-12-17 PROBLEM — I49.8 CHRONOTROPIC INCOMPETENCE WITH SINUS NODE DYSFUNCTION: Status: RESOLVED | Noted: 2024-04-02 | Resolved: 2024-12-17

## 2024-12-17 PROCEDURE — 1159F MED LIST DOCD IN RCRD: CPT | Performed by: INTERNAL MEDICINE

## 2024-12-17 PROCEDURE — 1157F ADVNC CARE PLAN IN RCRD: CPT | Performed by: INTERNAL MEDICINE

## 2024-12-17 PROCEDURE — 99214 OFFICE O/P EST MOD 30 MIN: CPT | Performed by: INTERNAL MEDICINE

## 2024-12-17 PROCEDURE — 1036F TOBACCO NON-USER: CPT | Performed by: INTERNAL MEDICINE

## 2024-12-17 PROCEDURE — 1160F RVW MEDS BY RX/DR IN RCRD: CPT | Performed by: INTERNAL MEDICINE

## 2024-12-17 RX ORDER — FLUOXETINE HYDROCHLORIDE 20 MG/1
20 CAPSULE ORAL DAILY
COMMUNITY
Start: 2024-12-10

## 2024-12-17 NOTE — PATIENT INSTRUCTIONS
Please bring all medicines, vitamins, and herbal supplements with you when you come to the office.    Prescriptions will not be filled unless you are compliant with your follow up appointments or have a follow up appointment scheduled as per instruction of your physician. Refills should be requested at the time of your visit.     BMI was above normal measurement. Current weight: 74.2 kg (163 lb 9.6 oz)  Weight change since last visit (-) denotes wt loss -1.4 lbs   Weight loss needed to achieve BMI 25: 6.7 Lbs  Weight loss needed to achieve BMI 30: -24.7 Lbs  Provided instructions on dietary changes  Provided instructions on exercise.

## 2024-12-17 NOTE — PROGRESS NOTES
"Subjective   Geovanny Elise is a 84 y.o. 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     He underwent aortic valve replacement with trifecta [place] Mono bioprosthetic aortic valve in 2021, and left atrial clip appendage occluder device.    He is slow down a bit with his psoriatic arthritis however maintains independence.  He does state that he is having a more difficult time getting around and navigating; he would like to switch his pacemaker follow-up to [place] device clinic for ease of geography and his own ambulatory status.    He girma simply on apixaban and rosuvastatin.  Amiodarone has been withdrawn    Last LDL recently measured is 69    Last device check reveals ventricular paced rhythm at 60.  He is now treated for atrial fibrillation with rate control.  Dr. Page last note is reviewed.    Recommendations: Follow-up in 1 year, switch pacemaker device clinic to [place]           Review of Systems   All other systems reviewed and are negative.           Vitals:    12/17/24 1010   BP: 132/88   BP Location: Left arm   Patient Position: Sitting   Pulse: 62   Weight: 74.2 kg (163 lb 9.6 oz)   Height: 1.689 m (5' 6.5\")        Objective   Physical Exam  Constitutional:       Appearance: Normal appearance.   HENT:      Nose: Nose normal.   Neck:      Vascular: No carotid bruit.   Cardiovascular:      Rate and Rhythm: Normal rate.      Pulses: Normal pulses.      Heart sounds: Normal heart sounds.   Pulmonary:      Effort: Pulmonary effort is normal.   Abdominal:      General: Bowel sounds are normal.      Palpations: Abdomen is soft.   Musculoskeletal:         General: Normal range of motion.      Cervical back: Normal " range of motion.      Right lower leg: No edema.      Left lower leg: No edema.   Skin:     General: Skin is warm and dry.   Neurological:      General: No focal deficit present.      Mental Status: He is alert.   Psychiatric:         Mood and Affect: Mood normal.         Behavior: Behavior normal.         Thought Content: Thought content normal.         Judgment: Judgment normal.         Allergies  Patient has no known allergies.     Current Medications    Current Outpatient Medications:     acetaminophen (Tylenol 8 HOUR) 650 mg ER tablet, Take 1 tablet (650 mg) by mouth every 8 hours if needed for mild pain (1 - 3). Do not crush, chew, or split., Disp: , Rfl:     amoxicillin (Amoxil) 500 mg capsule, Take 4 capsules (2,000 mg) by mouth. Prior to dental work, Disp: , Rfl:     calcium carbonate-vitamin D3 600 mg-20 mcg (800 unit) tablet, Take 1 tablet by mouth once daily., Disp: , Rfl:     dorzolamide (Trusopt) 2 % ophthalmic solution, Administer into affected eye(s) twice a day., Disp: , Rfl:     Eliquis 5 mg tablet, TAKE 1 TABLET TWICE DAILY AS DIRECTED, Disp: 180 tablet, Rfl: 3    FLUoxetine (PROzac) 20 mg capsule, Take 1 capsule (20 mg) by mouth once daily., Disp: , Rfl:     latanoprost (Xalatan) 0.005 % ophthalmic solution, Administer into affected eye(s) once daily., Disp: , Rfl:     levothyroxine (Synthroid, Levoxyl) 88 mcg tablet, Take 1 tablet (88 mcg) by mouth once daily in the morning. Take before meals., Disp: , Rfl:     multivit-min/ferrous fumarate (MULTI VITAMIN ORAL), Take 1 tablet by mouth once daily., Disp: , Rfl:     rosuvastatin (Crestor) 10 mg tablet, Take 1 tablet (10 mg) by mouth once daily., Disp: , Rfl:     sulfaSALAzine (Azulfidine) 500 mg tablet, Take 3 tablets (1,500 mg) by mouth 2 times a day., Disp: , Rfl:     triamcinolone (Kenalog) 0.1 % cream, as directed, Disp: , Rfl:                      Assessment/Plan   1. Atherosclerosis of coronary artery bypass graft of native heart without  angina pectoris  Follow Up In Cardiology      2. Permanent atrial fibrillation (Multi)        3. Anticoagulated        4. Sinus node dysfunction (Multi)        5. Pacemaker        6. Mixed hyperlipidemia        7. BMI 26.0-26.9,adult        8. Former smoker                 Scribe Attestation  By signing my name below, ILeila LPN, Scribe   attest that this documentation has been prepared under the direction and in the presence of Edgard Hayward DO.     Provider Attestation - Scribe documentation    All medical record entries made by the Scribe were at my direction and personally dictated by me. I have reviewed the chart and agree that the record accurately reflects my personal performance of the history, physical exam, discussion and plan.

## 2024-12-17 NOTE — LETTER
December 17, 2024     Jaycob Hobbs DO  [address]  [facility]  [facility] [address]    Patient: Geovanny Elise   YOB: 1940   Date of Visit: 12/17/2024       Dear Dr. Jaycob Hobbs DO:    Thank you for referring Geovanny Elise to me for evaluation. Below are my notes for this consultation.  If you have questions, please do not hesitate to call me. I look forward to following your patient along with you.       Sincerely,     Edgard Hayward DO      CC: No Recipients  ______________________________________________________________________________________    Subjective   Geovanny Elise is a 84 y.o. 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     He underwent aortic valve replacement with trifecta [place] bioprosthetic aortic valve in 2021, and left atrial clip appendage occluder device.    He is slow down a bit with his psoriatic arthritis however maintains independence.  He does state that he is having a more difficult time getting around and navigating; he would like to switch his pacemaker follow-up to [place] device clinic for ease of geography and his own ambulatory status.    He girma simply on apixaban and rosuvastatin.  Amiodarone has been withdrawn    Last LDL recently measured is 69    Last device check reveals ventricular paced rhythm at 60.  He is now treated for atrial fibrillation with rate control.  Dr. Page last note is reviewed.    Recommendations: Follow-up in 1 year, switch pacemaker device clinic to [place]           Review of Systems   All other systems reviewed and are negative.           Vitals:    12/17/24 1010   BP: 132/88   BP Location: Left arm  "  Patient Position: Sitting   Pulse: 62   Weight: 74.2 kg (163 lb 9.6 oz)   Height: 1.689 m (5' 6.5\")        Objective   Physical Exam  Constitutional:       Appearance: Normal appearance.   HENT:      Nose: Nose normal.   Neck:      Vascular: No carotid bruit.   Cardiovascular:      Rate and Rhythm: Normal rate.      Pulses: Normal pulses.      Heart sounds: Normal heart sounds.   Pulmonary:      Effort: Pulmonary effort is normal.   Abdominal:      General: Bowel sounds are normal.      Palpations: Abdomen is soft.   Musculoskeletal:         General: Normal range of motion.      Cervical back: Normal range of motion.      Right lower leg: No edema.      Left lower leg: No edema.   Skin:     General: Skin is warm and dry.   Neurological:      General: No focal deficit present.      Mental Status: He is alert.   Psychiatric:         Mood and Affect: Mood normal.         Behavior: Behavior normal.         Thought Content: Thought content normal.         Judgment: Judgment normal.         Allergies  Patient has no known allergies.     Current Medications    Current Outpatient Medications:   •  acetaminophen (Tylenol 8 HOUR) 650 mg ER tablet, Take 1 tablet (650 mg) by mouth every 8 hours if needed for mild pain (1 - 3). Do not crush, chew, or split., Disp: , Rfl:   •  amoxicillin (Amoxil) 500 mg capsule, Take 4 capsules (2,000 mg) by mouth. Prior to dental work, Disp: , Rfl:   •  calcium carbonate-vitamin D3 600 mg-20 mcg (800 unit) tablet, Take 1 tablet by mouth once daily., Disp: , Rfl:   •  dorzolamide (Trusopt) 2 % ophthalmic solution, Administer into affected eye(s) twice a day., Disp: , Rfl:   •  Eliquis 5 mg tablet, TAKE 1 TABLET TWICE DAILY AS DIRECTED, Disp: 180 tablet, Rfl: 3  •  FLUoxetine (PROzac) 20 mg capsule, Take 1 capsule (20 mg) by mouth once daily., Disp: , Rfl:   •  latanoprost (Xalatan) 0.005 % ophthalmic solution, Administer into affected eye(s) once daily., Disp: , Rfl:   •  levothyroxine " (Synthroid, Levoxyl) 88 mcg tablet, Take 1 tablet (88 mcg) by mouth once daily in the morning. Take before meals., Disp: , Rfl:   •  multivit-min/ferrous fumarate (MULTI VITAMIN ORAL), Take 1 tablet by mouth once daily., Disp: , Rfl:   •  rosuvastatin (Crestor) 10 mg tablet, Take 1 tablet (10 mg) by mouth once daily., Disp: , Rfl:   •  sulfaSALAzine (Azulfidine) 500 mg tablet, Take 3 tablets (1,500 mg) by mouth 2 times a day., Disp: , Rfl:   •  triamcinolone (Kenalog) 0.1 % cream, as directed, Disp: , Rfl:                      Assessment/Plan   1. Atherosclerosis of coronary artery bypass graft of native heart without angina pectoris  Follow Up In Cardiology      2. Permanent atrial fibrillation (Multi)        3. Anticoagulated        4. Sinus node dysfunction (Multi)        5. Pacemaker        6. Mixed hyperlipidemia        7. BMI 26.0-26.9,adult        8. Former smoker                 Scribe Attestation  By signing my name below, I, Leila ALY LPN Scribe   attest that this documentation has been prepared under the direction and in the presence of Edgard Hayward DO.     Provider Attestation - Scribe documentation    All medical record entries made by the Scribe were at my direction and personally dictated by me. I have reviewed the chart and agree that the record accurately reflects my personal performance of the history, physical exam, discussion and plan.

## 2025-01-16 ENCOUNTER — TELEPHONE (OUTPATIENT)
Dept: CARDIOLOGY | Facility: CLINIC | Age: 85
End: 2025-01-16
Payer: MEDICARE

## 2025-01-16 NOTE — TELEPHONE ENCOUNTER
Call from patient inquiring if his pacemaker device has been transmitting properly.  Pt  had an appt with Dr. Page 07.2025, but it was cancelled due to transportation and patient wanting to follow up closer to home.  A referral to the [facility] was sent back in December 2024; provided phone number to that facility for patient to call and schedule.  Pt verbalized understanding and agreement.

## 2025-03-11 ENCOUNTER — TELEPHONE (OUTPATIENT)
Dept: CARDIOLOGY | Facility: CLINIC | Age: 85
End: 2025-03-11
Payer: MEDICARE

## 2025-03-11 ENCOUNTER — HOSPITAL ENCOUNTER (OUTPATIENT)
Dept: CARDIOLOGY | Facility: HOSPITAL | Age: 85
Discharge: HOME | End: 2025-03-11
Payer: MEDICARE

## 2025-03-11 DIAGNOSIS — I49.5 SICK SINUS SYNDROME (MULTI): ICD-10-CM

## 2025-03-11 DIAGNOSIS — Z95.0 PACEMAKER: ICD-10-CM

## 2025-03-11 PROCEDURE — 93296 REM INTERROG EVL PM/IDS: CPT

## 2025-03-11 PROCEDURE — 93294 REM INTERROG EVL PM/LDLS PM: CPT | Performed by: INTERNAL MEDICINE

## 2025-03-11 NOTE — TELEPHONE ENCOUNTER
PA for Eliquis processed and approved via Covermymeds[employer]  Covermymeds KEY:  [id number].      Outcome  [facility]  PA Case: [id number], Status: Approved, Coverage Starts on: 1/1/2025 12:00:00 AM, Coverage Ends on: 12/31/2025 12:00:00 AM. Questions? Contact [phone].  Effective Date: 12/31/2024  Authorization Expiration Date: 12/30/2025    Called listed number.  Left voice mail regarding above.  Sandra Segovia, CMA

## 2025-06-17 ENCOUNTER — HOSPITAL ENCOUNTER (OUTPATIENT)
Dept: CARDIOLOGY | Facility: HOSPITAL | Age: 85
Discharge: HOME | End: 2025-06-17
Payer: MEDICARE

## 2025-06-17 DIAGNOSIS — Z95.0 PACEMAKER: ICD-10-CM

## 2025-06-17 DIAGNOSIS — I49.5 SICK SINUS SYNDROME (MULTI): ICD-10-CM

## 2025-06-17 PROCEDURE — 93294 REM INTERROG EVL PM/LDLS PM: CPT | Performed by: INTERNAL MEDICINE

## 2025-06-17 PROCEDURE — 93296 REM INTERROG EVL PM/IDS: CPT

## 2025-07-23 ENCOUNTER — APPOINTMENT (OUTPATIENT)
Dept: CARDIOLOGY | Facility: HOSPITAL | Age: 85
End: 2025-07-23
Payer: MEDICARE

## 2025-07-23 ENCOUNTER — APPOINTMENT (OUTPATIENT)
Dept: CARDIOLOGY | Facility: CLINIC | Age: 85
End: 2025-07-23
Payer: MEDICARE

## 2025-12-17 ENCOUNTER — APPOINTMENT (OUTPATIENT)
Dept: CARDIOLOGY | Facility: CLINIC | Age: 85
End: 2025-12-17
Payer: MEDICARE

## 2025-12-31 ENCOUNTER — APPOINTMENT (OUTPATIENT)
Dept: CARDIOLOGY | Facility: CLINIC | Age: 85
End: 2025-12-31
Payer: MEDICARE

## (undated) DEVICE — DILATOR, VESSEL, COONS, 20FR X 20CM

## (undated) DEVICE — GUIDEWIRE, STRAIGHT TIP,  .035 DIA, 180 CM, 3 CM TIP"

## (undated) DEVICE — SHEATH, INTRODUCER, MICRA, 23FR  X 55.7CM

## (undated) DEVICE — GUIDEWIRE, STRAIGHT, AMPLATZ SUPER STIFF, 0.035 IN X 180 CM

## (undated) DEVICE — INTRODUCER, TRANSSEPTAL GUIDE, SWARTZ, SLO 8FR/63CM

## (undated) DEVICE — Device

## (undated) DEVICE — INTRODUCER, SHEATH, FAST-CATH, 8FR X 12CM, C-LOCK

## (undated) DEVICE — BANDAGE, QUIKCLOT, INTERVENTIONAL HEMO, W/O SLIT